# Patient Record
Sex: FEMALE | Race: WHITE | Employment: FULL TIME | URBAN - METROPOLITAN AREA
[De-identification: names, ages, dates, MRNs, and addresses within clinical notes are randomized per-mention and may not be internally consistent; named-entity substitution may affect disease eponyms.]

---

## 2017-03-30 ENCOUNTER — TELEPHONE (OUTPATIENT)
Dept: INTERNAL MEDICINE CLINIC | Age: 32
End: 2017-03-30

## 2017-03-30 NOTE — TELEPHONE ENCOUNTER
Pt states that she has such a lower back issue and threw her back out while exercising last night. She was advised that she would need to be evaluated to receive proper treatment.  Pt has an appointment scheduled with DSE,NP for tomorrow at 1140am.

## 2017-03-30 NOTE — TELEPHONE ENCOUNTER
Patient states she threw her back out again, and she would like to know if Dr. Janis Eng would prescribe something for her.

## 2017-03-31 ENCOUNTER — OFFICE VISIT (OUTPATIENT)
Dept: INTERNAL MEDICINE CLINIC | Age: 32
End: 2017-03-31

## 2017-03-31 VITALS
OXYGEN SATURATION: 96 % | SYSTOLIC BLOOD PRESSURE: 120 MMHG | BODY MASS INDEX: 32.59 KG/M2 | RESPIRATION RATE: 18 BRPM | WEIGHT: 166 LBS | HEART RATE: 78 BPM | HEIGHT: 60 IN | DIASTOLIC BLOOD PRESSURE: 80 MMHG | TEMPERATURE: 98 F

## 2017-03-31 VITALS
RESPIRATION RATE: 18 BRPM | BODY MASS INDEX: 32.59 KG/M2 | HEIGHT: 60 IN | OXYGEN SATURATION: 98 % | WEIGHT: 166 LBS | TEMPERATURE: 98 F | SYSTOLIC BLOOD PRESSURE: 120 MMHG | DIASTOLIC BLOOD PRESSURE: 80 MMHG | HEART RATE: 78 BPM

## 2017-03-31 DIAGNOSIS — S39.012A LUMBAR STRAIN, INITIAL ENCOUNTER: Primary | ICD-10-CM

## 2017-03-31 DIAGNOSIS — M54.5 ACUTE RIGHT-SIDED LOW BACK PAIN, WITH SCIATICA PRESENCE UNSPECIFIED: Primary | ICD-10-CM

## 2017-03-31 RX ORDER — CYCLOBENZAPRINE HCL 10 MG
10 TABLET ORAL
Qty: 30 TAB | Refills: 0 | Status: SHIPPED | OUTPATIENT
Start: 2017-03-31 | End: 2018-01-10

## 2017-03-31 NOTE — MR AVS SNAPSHOT
Visit Information Date & Time Provider Department Dept. Phone Encounter #  
 3/31/2017  2:00 PM MD [facility] [phone] Your Appointments 3/31/2017  2:00 PM  
ACUTE CARE with MD [facility] ([address]) Appt Note: back pain  
 [address], [address] [place] [address]  
[address], [place] [address] [id number] Upcoming Health Maintenance Date Due Pneumococcal 19-64 Medium Risk (1 of 1 - PPSV23) 10/29/2006 PAP AKA CERVICAL CYTOLOGY 4/11/2019 DTaP/Tdap/Td series (2 - Td) 5/2/2026 Allergies as of 3/31/2017  Review Complete On: 3/31/2017 By: Pily Thomson LPN Severity Noted Reaction Type Reactions Sulfa (Sulfonamide Antibiotics) Medium 12/17/2014   Side Effect Rash Minocycline  10/31/2013    Other (comments) Brain swelling Current Immunizations  Reviewed on 5/2/2016 Name Date Tdap 5/2/2016 Not reviewed this visit You Were Diagnosed With   
  
 Codes Comments Lumbar strain, initial encounter    -  Primary ICD-10-CM: M39.913B ICD-9-CM: 918. 2 Vitals BP Pulse Temp Resp Height(growth percentile) Weight(growth percentile) 120/80 (BP 1 Location: Left arm, BP Patient Position: Sitting) 78 98 °F (36.7 °C) 18 5' (1.524 m) 166 lb (75.3 kg) LMP SpO2 BMI OB Status Smoking Status 03/08/2017 96% 32.42 kg/m2 Having regular periods Never Smoker BMI and BSA Data Body Mass Index Body Surface Area  
 32.42 kg/m 2 1.79 m 2 Preferred Pharmacy Pharmacy Name Phone [id number], [address] Eliud Snow [phone] Your Updated Medication List  
  
   
This list is accurate as of: 3/31/17 12:58 PM.  Always use your most recent med list.  
  
  
  
  
 albuterol 90 mcg/actuation inhaler Commonly known as:  PROVENTIL HFA, VENTOLIN HFA, PROAIR HFA  
 Take 2 Puffs by inhalation every four (4) hours as needed for Wheezing. cyclobenzaprine 10 mg tablet Commonly known as:  FLEXERIL Take 1 Tab by mouth three (3) times daily (with meals). DULERA 100-5 mcg/actuation HFA inhaler Generic drug:  mometasone-formoterol Take 2 Puffs by inhalation two (2) times a day. levothyroxine 25 mcg tablet Commonly known as:  SYNTHROID Take 1 Tab by mouth Daily (before breakfast). montelukast 10 mg tablet Commonly known as:  SINGULAIR Take 1 Tab by mouth daily. NASACORT AQ 55 mcg nasal inhaler Generic drug:  triamcinolone 2 Sprays daily. ZyrTEC 10 mg Cap Generic drug:  Cetirizine Take  by mouth. Prescriptions Sent to Pharmacy Refills  
 cyclobenzaprine (FLEXERIL) 10 mg tablet 0 Sig: Take 1 Tab by mouth three (3) times daily (with meals). Class: Normal  
 Pharmacy: [facility]/pharmacy [address] [place] [address] #: [phone] Route: Oral  
  
Introducing [facility]! Dariel Liu introduces "[employer] patient portal. Now you can access parts of your medical record, email your doctor's office, and request medication refills online. 1. In your internet browser, go to [email or web address] 2. Click on the First Time User? Click Here link in the Sign In box. You will see the New Member Sign Up page. 3. Enter your "[employer] Access Code exactly as it appears below. You will not need to use this code after youve completed the sign-up process. If you do not sign up before the expiration date, you must request a new code. · "[employer] Access Code: [email or web address] Expires: 6/29/2017 12:54 PM 
 
4. Enter the last four digits of your Social Security Number (xxxx) and Date of Birth (mm/dd/yyyy) as indicated and click Submit. You will be taken to the next sign-up page. 5. Create a "[employer] ID.  This will be your "[employer] login ID and cannot be changed, so think of one that is secure and easy to remember. 6. Create a [employer] password. You can change your password at any time. 7. Enter your Password Reset Question and Answer. This can be used at a later time if you forget your password. 8. Enter your e-mail address. You will receive e-mail notification when new information is available in [address] 9. Click Sign Up. You can now view and download portions of your medical record. 10. Click the Download Summary menu link to download a portable copy of your medical information. If you have questions, please visit the Frequently Asked Questions section of the [employer] website. Remember, [employer] is NOT to be used for urgent needs. For medical emergencies, dial 911. Now available from your iPhone and Android! Please provide this summary of care documentation to your next provider. Your primary care clinician is listed as Bibi Kaur. If you have any questions after today's visit, please call [phone].

## 2017-03-31 NOTE — PROGRESS NOTES
Chief Complaint   Patient presents with    Back Pain     started tuesday, threw back out doing exercises    Numbness     right leg     Neck Pain     this morning

## 2017-03-31 NOTE — PROGRESS NOTES
Subjective:      Trish Guerrero is a 36 y.o. female who presents today with low back pain. Hx of degenerative disc in lumbar region. She denies any trauma. Started about 3 days ago . Painful to stand for any length of time. Pain radiates down right leg    Patient Active Problem List   Diagnosis Code    Nephrolithiasis N20.0    Allergic rhinitis J30.9    Pap smear for cervical cancer screening Z12.4    PCOS (polycystic ovarian syndrome) E28.2    Allergic asthma without complication B71.659    Acquired hypothyroidism E03.9     Current Outpatient Prescriptions   Medication Sig Dispense Refill    cyclobenzaprine (FLEXERIL) 10 mg tablet Take 1 Tab by mouth three (3) times daily (with meals). 30 Tab 0    levothyroxine (SYNTHROID) 25 mcg tablet Take 1 Tab by mouth Daily (before breakfast). 30 Tab 5    montelukast (SINGULAIR) 10 mg tablet Take 1 Tab by mouth daily. 30 Tab 5    Cetirizine (ZYRTEC) 10 mg cap Take  by mouth.  triamcinolone (NASACORT AQ) 55 mcg nasal inhaler 2 Sprays daily.  mometasone-formoterol (DULERA) 100-5 mcg/actuation HFA inhaler Take 2 Puffs by inhalation two (2) times a day.  albuterol (PROVENTIL HFA, VENTOLIN HFA, PROAIR HFA) 90 mcg/actuation inhaler Take 2 Puffs by inhalation every four (4) hours as needed for Wheezing. 1 Inhaler 5        Review of Systems    Pertinent items are noted in HPI. Objective:     Visit Vitals    /80 (BP 1 Location: Left arm, BP Patient Position: Sitting)    Pulse 78    Temp 98 °F (36.7 °C)    Resp 18    Ht 5' (1.524 m)    Wt 166 lb (75.3 kg)    LMP 03/08/2017    SpO2 96%    BMI 32.42 kg/m2     General appearance: alert, cooperative, no distress, appears stated age  Head: Normocephalic, without obvious abnormality, atraumatic  Back: no tenderness along lumbar spine. Significant muscular tension in bilateral lower back. Assessment/Plan:     1.  Lumbar strain, initial encounter  -flexeril 10mg tid for first 2 days, then decrease use to bedtime as needed  -aleve 2 tab bid with food for next 3-5 days.  -ice 20 minutes followed by heat for 20 minutes daily  -stretches    Orders Placed This Encounter    cyclobenzaprine (FLEXERIL) 10 mg tablet     Sig: Take 1 Tab by mouth three (3) times daily (with meals). Dispense:  30 Tab     Refill:  0         Follow-up Disposition:     Return if symptoms worsen or fail to improve. Advised patient to call back or return to office if symptoms worsen/change/persist.     Discussed expected course/resolution/complications of diagnosis in detail with patient. Medication risks/benefits/costs/interactions/alternatives discussed with patient. Patient was given an after visit summary which includes diagnoses, current medications, & vitals. Patient expressed understanding with the diagnosis and plan.

## 2017-05-15 RX ORDER — MONTELUKAST SODIUM 10 MG/1
TABLET ORAL
Qty: 30 TAB | Refills: 5 | Status: SHIPPED | OUTPATIENT
Start: 2017-05-15 | End: 2017-10-13 | Stop reason: SDUPTHER

## 2017-10-13 RX ORDER — MONTELUKAST SODIUM 10 MG/1
TABLET ORAL
Qty: 30 TAB | Refills: 3 | Status: SHIPPED | OUTPATIENT
Start: 2017-10-13 | End: 2019-04-19 | Stop reason: SDUPTHER

## 2018-01-09 DIAGNOSIS — J45.901 REACTIVE AIRWAY DISEASE, UNSPECIFIED ASTHMA SEVERITY, WITH ACUTE EXACERBATION: ICD-10-CM

## 2018-01-09 RX ORDER — ALBUTEROL SULFATE 90 UG/1
2 AEROSOL, METERED RESPIRATORY (INHALATION)
Qty: 1 INHALER | Refills: 5 | Status: CANCELLED | OUTPATIENT
Start: 2018-01-09

## 2018-01-09 NOTE — TELEPHONE ENCOUNTER
Pt called stating that she is afraid that she is going to have an asthma attack and wants to know if script will be refilled or does she need to come in for an appointment before inhalers will be refilled.  Pt would like to know as soon as possible so that she can schedule appointment if necessary

## 2018-01-09 NOTE — TELEPHONE ENCOUNTER
Requested Prescriptions     Pending Prescriptions Disp Refills    albuterol (PROVENTIL HFA, VENTOLIN HFA, PROAIR HFA) 90 mcg/actuation inhaler 1 Inhaler 5     Sig: Take 2 Puffs by inhalation every four (4) hours as needed for Wheezing.  mometasone-formoterol (DULERA) 100-5 mcg/actuation HFA inhaler       Sig: Take 2 Puffs by inhalation two (2) times a day. Pt would like a refill on her inhalers. Pt states that she has a cold and it is causing her asthma to flare up.      Last OV: 3/31/17  Next OV:NONE

## 2018-01-09 NOTE — TELEPHONE ENCOUNTER
Last routine/annual office visit - 05/02/16 (1 yr f/u requested)  Next office visit - n/a  Last refill - albuterol 11. 2.15; dulera - never prescribed by our office. Pt is scheduled for CPE tomorrow; Per Dr. De Leon November the request for medication will be completed during visit. Requested Prescriptions     Pending Prescriptions Disp Refills    albuterol (PROVENTIL HFA, VENTOLIN HFA, PROAIR HFA) 90 mcg/actuation inhaler 1 Inhaler 5     Sig: Take 2 Puffs by inhalation every four (4) hours as needed for Wheezing.  mometasone-formoterol (DULERA) 100-5 mcg/actuation HFA inhaler       Sig: Take 2 Puffs by inhalation two (2) times a day.

## 2018-01-10 ENCOUNTER — OFFICE VISIT (OUTPATIENT)
Dept: INTERNAL MEDICINE CLINIC | Age: 33
End: 2018-01-10

## 2018-01-10 VITALS
BODY MASS INDEX: 33.18 KG/M2 | WEIGHT: 169 LBS | TEMPERATURE: 99.7 F | HEART RATE: 87 BPM | OXYGEN SATURATION: 97 % | RESPIRATION RATE: 15 BRPM | DIASTOLIC BLOOD PRESSURE: 72 MMHG | SYSTOLIC BLOOD PRESSURE: 106 MMHG | HEIGHT: 60 IN

## 2018-01-10 DIAGNOSIS — Z12.4 PAP SMEAR FOR CERVICAL CANCER SCREENING: Chronic | ICD-10-CM

## 2018-01-10 DIAGNOSIS — Z00.00 ANNUAL PHYSICAL EXAM: Primary | ICD-10-CM

## 2018-01-10 DIAGNOSIS — J45.20 MILD INTERMITTENT ASTHMA WITHOUT COMPLICATION: ICD-10-CM

## 2018-01-10 DIAGNOSIS — Z79.899 ENCOUNTER FOR LONG-TERM (CURRENT) USE OF MEDICATIONS: ICD-10-CM

## 2018-01-10 DIAGNOSIS — E03.9 HYPOTHYROIDISM, ADULT: ICD-10-CM

## 2018-01-10 RX ORDER — ALBUTEROL SULFATE 90 UG/1
1 AEROSOL, METERED RESPIRATORY (INHALATION)
Qty: 1 INHALER | Refills: 1 | Status: SHIPPED | OUTPATIENT
Start: 2018-01-10 | End: 2021-06-03 | Stop reason: ALTCHOICE

## 2018-01-10 NOTE — MR AVS SNAPSHOT
Visit Information Date & Time Provider Department Dept. Phone Encounter #  
 1/10/2018 10:00 AM Mamta Haider MD [place] [address] [facility] [phone] [id number] Follow-up Instructions Return in about 6 months (around 7/10/2018) for hypothyroid, asthma. Upcoming Health Maintenance Date Due  
 PAP AKA CERVICAL CYTOLOGY 4/11/2019 DTaP/Tdap/Td series (2 - Td) 5/2/2026 Allergies as of 1/10/2018  Review Complete On: 1/10/2018 By: Mai Alvarez Severity Noted Reaction Type Reactions Sulfa (Sulfonamide Antibiotics) Medium 12/17/2014   Side Effect Rash Minocycline  10/31/2013    Other (comments) Brain swelling Current Immunizations  Reviewed on 5/2/2016 Name Date Tdap 5/2/2016 Not reviewed this visit You Were Diagnosed With   
  
 Codes Comments Annual physical exam    -  Primary ICD-10-CM: Z00.00 ICD-9-CM: V70.0 Hypothyroidism, adult     ICD-10-CM: E03.9 ICD-9-CM: 244.9 Encounter for long-term (current) use of medications     ICD-10-CM: Z79.899 ICD-9-CM: V58.69 Pap smear for cervical cancer screening     ICD-10-CM: Z12.4 ICD-9-CM: V76.2 Vitals BP Pulse Temp Resp Height(growth percentile) Weight(growth percentile) 106/72 (BP 1 Location: Left arm, BP Patient Position: Sitting) 87 99.7 °F (37.6 °C) (Oral) 15 5' (1.524 m) 169 lb (76.7 kg) LMP SpO2 BMI OB Status Smoking Status 01/05/2018 (Exact Date) 97% 33.01 kg/m2 Having regular periods Never Smoker Vitals History BMI and BSA Data Body Mass Index Body Surface Area 33.01 kg/m 2 1.8 m 2 Preferred Pharmacy Pharmacy Name Phone [id number], [address] Ry Burden [phone] Your Updated Medication List  
  
   
This list is accurate as of: 1/10/18 10:54 AM.  Always use your most recent med list.  
  
  
  
  
 albuterol 90 mcg/actuation inhaler Commonly known as:  PROVENTIL HFA, VENTOLIN HFA, PROAIR HFA Take 1 Puff by inhalation every four (4) hours as needed for Wheezing. levothyroxine 25 mcg tablet Commonly known as:  SYNTHROID Take 1 Tab by mouth Daily (before breakfast). montelukast 10 mg tablet Commonly known as:  SINGULAIR  
TAKE 1 TAB BY MOUTH DAILY. ZyrTEC 10 mg Cap Generic drug:  Cetirizine Take  by mouth. Prescriptions Sent to Pharmacy Refills  
 albuterol (PROVENTIL HFA, VENTOLIN HFA, PROAIR HFA) 90 mcg/actuation inhaler 1 Sig: Take 1 Puff by inhalation every four (4) hours as needed for Wheezing. Class: Normal  
 Pharm[facility]  #: [phone] Route: Inhalation We Performed the Following CBC WITH AUTOMATED DIFF [45567 CPT(R)] LIPID PANEL [49972 CPT(R)] METABOLIC PANEL, COMPREHENSIVE [83736 CPT(R)] REFERRAL TO OBSTETRICS AND GYNECOLOGY [REF51 Custom] T4, FREE [id number] CPT(R)] TSH 3RD GENERATION [63650 CPT(R)] URINALYSIS W/ RFLX MICROSCOPIC [61890 CPT(R)] Follow-up Instructions Return in about 6 months (around 7/10/2018) for hypothyroid, asthma. Referral Information Referral ID Referred By Referred To  
  
 [id number] [address] [facility], [address] Phone: [phone] Fax: [phone] Visits Status Start Date End Date 1 New Request 1/10/18 1/10/19 If your referral has a status of pending review or denied, additional information will be sent to support the outcome of this decision. Introducing [facility]! Michelle Townsend introduces [employer] patient portal. Now you can access parts of your medical record, email your doctor's office, and request medication refills online. 1. In your internet browser, go to [email or web address] 2. Click on the First Time User? Click Here link in the Sign In box. You will see the New Member Sign Up page. 3. Enter your [employer] Access Code exactly as it appears below. You will not need to use this code after youve completed the sign-up process. If you do not sign up before the expiration date, you must request a new code. · [employer] Access Code: [email or web address] Expires: 4/10/2018 10:45 AM 
 
4. Enter the last four digits of your Social Security Number (xxxx) and Date of Birth (mm/dd/yyyy) as indicated and click Submit. You will be taken to the next sign-up page. 5. Create a [employer] ID. This will be your [employer] login ID and cannot be changed, so think of one that is secure and easy to remember. 6. Create a [employer] password. You can change your password at any time. 7. Enter your Password Reset Question and Answer. This can be used at a later time if you forget your password. 8. Enter your e-mail address. You will receive e-mail notification when new information is available in [address] 9. Click Sign Up. You can now view and download portions of your medical record. 10. Click the Download Summary menu link to download a portable copy of your medical information. If you have questions, please visit the Frequently Asked Questions section of the [employer] website. Remember, [employer] is NOT to be used for urgent needs. For medical emergencies, dial 911. Now available from your iPhone and Android! Please provide this summary of care documentation to your next provider. Your primary care clinician is listed as Bibi Kaur. If you have any questions after today's visit, please call [phone].

## 2018-01-10 NOTE — PROGRESS NOTES
Subjective:      Jennifer Black is a 29 y.o. female who presents today for her annual exam.     She saw an allergist, Dr. Romeo Camacho in 2015/2016 - at that time she was having an asthma flare and was put on dulera, singulair, and zyrtec at that time. She has not had any trouble since that time. She got a cold last week and started to have coughing, but no shortness of breath. She has history of hypothyroid. Stopped taking medication after about 1 month of medications. Patient Active Problem List   Diagnosis Code    Nephrolithiasis N20.0    Allergic rhinitis J30.9    Pap smear for cervical cancer screening Z12.4    PCOS (polycystic ovarian syndrome) E28.2    Allergic asthma without complication Z71.161    Acquired hypothyroidism E03.9     Current Outpatient Prescriptions   Medication Sig Dispense Refill    montelukast (SINGULAIR) 10 mg tablet TAKE 1 TAB BY MOUTH DAILY. 30 Tab 3    Cetirizine (ZYRTEC) 10 mg cap Take  by mouth.  mometasone-formoterol (DULERA) 100-5 mcg/actuation HFA inhaler Take 2 Puffs by inhalation two (2) times a day.  albuterol (PROVENTIL HFA, VENTOLIN HFA, PROAIR HFA) 90 mcg/actuation inhaler Take 2 Puffs by inhalation every four (4) hours as needed for Wheezing. 1 Inhaler 5    levothyroxine (SYNTHROID) 25 mcg tablet Take 1 Tab by mouth Daily (before breakfast). 30 Tab 5        Review of Systems    A comprehensive review of systems was negative except for that written in the HPI. Objective:     Visit Vitals    /72 (BP 1 Location: Left arm, BP Patient Position: Sitting)    Pulse 87    Temp 99.7 °F (37.6 °C) (Oral)    Resp 15    Ht 5' (1.524 m)    Wt 169 lb (76.7 kg)    LMP 01/05/2018 (Exact Date)    SpO2 97%    BMI 33.01 kg/m2     General:  Alert, cooperative, no distress, appears stated age. Head:  Normocephalic, without obvious abnormality, atraumatic. Eyes:  Conjunctivae/corneas clear. PERRL, EOMs intact. Fundi benign.    Ears:  Normal TMs and external ear canals both ears. Nose: Nares normal. Septum midline. Mucosa normal. No drainage or sinus tenderness. Throat: Lips, mucosa, and tongue normal. Teeth and gums normal.   Neck: Supple, symmetrical, trachea midline, no adenopathy, thyroid: no enlargement/tenderness/nodules, no carotid bruit and no JVD. Back:   Symmetric, no curvature. ROM normal. No CVA tenderness. Lungs:   Clear to auscultation bilaterally. Chest wall:  No tenderness or deformity. Heart:  Regular rate and rhythm, S1, S2 normal, no murmur, click, rub or gallop. Breast Exam:  No tenderness, masses, or nipple abnormality. Abdomen:   Soft, non-tender. Bowel sounds normal. No masses,  No organomegaly. Extremities: Extremities normal, atraumatic, no cyanosis or edema. Pulses: 2+ and symmetric all extremities. Skin: Skin color, texture, turgor normal. No rashes or lesions. Lymph nodes: Cervical, supraclavicular, and axillary nodes normal.           Assessment/Plan:     1. Annual physical exam  - last gyn with Dr. Osbaldo Garcia was April, 2017. She would like to switch to a provider that has midwives. -referred patient to Dr. Mike Thomas. Sudheer Fuller with [facility].    - CBC WITH AUTOMATED DIFF  - METABOLIC PANEL, COMPREHENSIVE  - LIPID PANEL  - URINALYSIS W/ RFLX MICROSCOPIC    Also, she has additional complaints of the following --.     2. Hypothyroidism, adult  -stressed compliance with use of levothyroxine.  -check labs. Restart medication if needed    - TSH 3RD GENERATION  - T4, FREE    3. Encounter for long-term (current) use of medications      4. Pap smear for cervical cancer screening  -see above. Patient referred to Dr. Mike Thomas. Aurther Crews.    - REFERRAL TO OBSTETRICS AND GYNECOLOGY     5. Asthma  -mild intermittent  -refilled her albuterol today.  -will get PFT done with Dr. Meagan Nascimento. Follow-up Disposition:     Follow up in 6 months     Return if symptoms worsen or fail to improve.    Advised patient to call back or return to office if symptoms worsen/change/persist.     Discussed expected course/resolution/complications of diagnosis in detail with patient. Medication risks/benefits/costs/interactions/alternatives discussed with patient. Patient was given an after visit summary which includes diagnoses, current medications, & vitals. Patient expressed understanding with the diagnosis and plan.

## 2018-01-10 NOTE — PROGRESS NOTES
Patient's identity verified with two patient identifiers (name and date of birth). 1. Have you been to the ER, urgent care clinic since your last visit? Hospitalized since your last visit? No  2. Have you seen or consulted any other health care providers outside of the [address] [place] [address] since your last visit? Include any pap smears or colon screening. No    Chief Complaint   Patient presents with    Complete Physical     Fasting.  Medication Evaluation     Requesting Albuterol and Dulera (never Rx by us) refill.  Asthma     Would like to discuss. \"I don't know\", unsure if environmental or anything else. Fasting. Health Maintenance Due   Topic Date Due    Pneumococcal 19-64 Medium Risk (1 of 1 - PPSV23)  Has not had. 10/29/2006    Influenza Age 9 to Adult   Has not had, declines. 08/01/2017     Reviewed record in preparation for visit and have obtained necessary documentation.     Wt Readings from Last 3 Encounters:   01/10/18 169 lb (76.7 kg)   03/31/17 166 lb (75.3 kg)   03/31/17 166 lb (75.3 kg)     Temp Readings from Last 3 Encounters:   01/10/18 99.7 °F (37.6 °C) (Oral)   03/31/17 98 °F (36.7 °C)   03/31/17 98 °F (36.7 °C) (Oral)     BP Readings from Last 3 Encounters:   01/10/18 106/72   03/31/17 120/80   03/31/17 120/80     Pulse Readings from Last 3 Encounters:   01/10/18 87   03/31/17 78   03/31/17 78     Learning Assessment:  :     Learning Assessment 1/10/2018 3/23/2015 10/31/2013   PRIMARY LEARNER Patient Patient Patient   HIGHEST LEVEL OF EDUCATION - PRIMARY LEARNER  SOME COLLEGE 4 YEARS OF COLLEGE -   BARRIERS PRIMARY LEARNER NONE NONE -   CO-LEARNER CAREGIVER No No -   PRIMARY LANGUAGE ENGLISH ENGLISH ENGLISH    NEED - No -   LEARNER PREFERENCE PRIMARY PICTURES DEMONSTRATION DEMONSTRATION     VIDEOS - -   LEARNING SPECIAL TOPICS - no -   ANSWERED BY patient patient patient   RELATIONSHIP SELF SELF SELF       Depression Screening:  :     PHQ over the last two weeks 1/10/2018   Little interest or pleasure in doing things Not at all   Feeling down, depressed or hopeless Not at all   Total Score PHQ 2 0       Fall Risk Assessment:  :     No flowsheet data found. Abuse Screening:  :     Abuse Screening Questionnaire 1/10/2018 11/2/2015 12/17/2014   Do you ever feel afraid of your partner? N N N   Are you in a relationship with someone who physically or mentally threatens you? N N N   Is it safe for you to go home?  Mike Mesa

## 2018-01-11 ENCOUNTER — TELEPHONE (OUTPATIENT)
Dept: INTERNAL MEDICINE CLINIC | Age: 33
End: 2018-01-11

## 2018-01-11 LAB
ALBUMIN SERPL-MCNC: 4.5 G/DL (ref 3.5–5.5)
ALBUMIN/GLOB SERPL: 1.8 {RATIO} (ref 1.2–2.2)
ALP SERPL-CCNC: 55 IU/L (ref 39–117)
ALT SERPL-CCNC: 18 IU/L (ref 0–32)
APPEARANCE UR: CLEAR
AST SERPL-CCNC: 19 IU/L (ref 0–40)
BASOPHILS # BLD AUTO: 0 X10E3/UL (ref 0–0.2)
BASOPHILS NFR BLD AUTO: 1 %
BILIRUB SERPL-MCNC: <0.2 MG/DL (ref 0–1.2)
BILIRUB UR QL STRIP: NEGATIVE
BUN SERPL-MCNC: 7 MG/DL (ref 6–20)
BUN/CREAT SERPL: 12 (ref 9–23)
CALCIUM SERPL-MCNC: 9.5 MG/DL (ref 8.7–10.2)
CHLORIDE SERPL-SCNC: 101 MMOL/L (ref 96–106)
CHOLEST SERPL-MCNC: 183 MG/DL (ref 100–199)
CO2 SERPL-SCNC: 26 MMOL/L (ref 18–29)
COLOR UR: YELLOW
CREAT SERPL-MCNC: 0.58 MG/DL (ref 0.57–1)
EOSINOPHIL # BLD AUTO: 0.3 X10E3/UL (ref 0–0.4)
EOSINOPHIL NFR BLD AUTO: 7 %
ERYTHROCYTE [DISTWIDTH] IN BLOOD BY AUTOMATED COUNT: 13.8 % (ref 12.3–15.4)
GLOBULIN SER CALC-MCNC: 2.5 G/DL (ref 1.5–4.5)
GLUCOSE SERPL-MCNC: 88 MG/DL (ref 65–99)
GLUCOSE UR QL: NEGATIVE
HCT VFR BLD AUTO: 41 % (ref 34–46.6)
HDLC SERPL-MCNC: 57 MG/DL
HGB BLD-MCNC: 13.1 G/DL (ref 11.1–15.9)
HGB UR QL STRIP: NEGATIVE
IMM GRANULOCYTES # BLD: 0 X10E3/UL (ref 0–0.1)
IMM GRANULOCYTES NFR BLD: 0 %
INTERPRETATION, 910389: NORMAL
KETONES UR QL STRIP: NEGATIVE
LDLC SERPL CALC-MCNC: 110 MG/DL (ref 0–99)
LEUKOCYTE ESTERASE UR QL STRIP: NEGATIVE
LYMPHOCYTES # BLD AUTO: 1.3 X10E3/UL (ref 0.7–3.1)
LYMPHOCYTES NFR BLD AUTO: 31 %
MCH RBC QN AUTO: 31 PG (ref 26.6–33)
MCHC RBC AUTO-ENTMCNC: 32 G/DL (ref 31.5–35.7)
MCV RBC AUTO: 97 FL (ref 79–97)
MICRO URNS: NORMAL
MONOCYTES # BLD AUTO: 0.6 X10E3/UL (ref 0.1–0.9)
MONOCYTES NFR BLD AUTO: 14 %
NEUTROPHILS # BLD AUTO: 2 X10E3/UL (ref 1.4–7)
NEUTROPHILS NFR BLD AUTO: 47 %
NITRITE UR QL STRIP: NEGATIVE
PH UR STRIP: 7.5 [PH] (ref 5–7.5)
PLATELET # BLD AUTO: 315 X10E3/UL (ref 150–379)
POTASSIUM SERPL-SCNC: 4.3 MMOL/L (ref 3.5–5.2)
PROT SERPL-MCNC: 7 G/DL (ref 6–8.5)
PROT UR QL STRIP: NEGATIVE
RBC # BLD AUTO: 4.22 X10E6/UL (ref 3.77–5.28)
SODIUM SERPL-SCNC: 141 MMOL/L (ref 134–144)
SP GR UR: 1.02 (ref 1–1.03)
T4 FREE SERPL-MCNC: 1.1 NG/DL (ref 0.82–1.77)
TRIGL SERPL-MCNC: 80 MG/DL (ref 0–149)
TSH SERPL DL<=0.005 MIU/L-ACNC: 3.47 UIU/ML (ref 0.45–4.5)
UROBILINOGEN UR STRIP-MCNC: 0.2 MG/DL (ref 0.2–1)
VLDLC SERPL CALC-MCNC: 16 MG/DL (ref 5–40)
WBC # BLD AUTO: 4.1 X10E3/UL (ref 3.4–10.8)

## 2018-01-11 NOTE — TELEPHONE ENCOUNTER
----- Message from Cassidy Steele MD sent at 1/10/2018  4:31 PM EST -----  Please call Dr. Dax Suarez, allergist, for her pulmonary function tests and his notes.   Done 2015/2016    Thanks    Jasper Del Real

## 2018-01-11 NOTE — TELEPHONE ENCOUNTER
Call to Dr. Elinor Suh office. Spoke with Jesse Veliz (front office staff) & requested pt's records. Jesse Veliz requested that an fax be sent to their office requesting the records. Fax number provided: [phone]    Request for records faxed to Dr. Radha Menjivar per Loletta Eisenmenger, MD's request. Specifically requested patient's pulmonary function test done around 2015/2016 and office notes.  Fax confirmation received 1/11/2018 4:27 PM

## 2018-01-19 ENCOUNTER — TELEPHONE (OUTPATIENT)
Dept: INTERNAL MEDICINE CLINIC | Age: 33
End: 2018-01-19

## 2018-01-19 NOTE — TELEPHONE ENCOUNTER
----- Message from Viola Gutta sent at 1/19/2018  1:45 PM EST -----  Regarding: Dr. Grier Severance  Pt is calling to f/u on blood work. The best contact is [phone].

## 2018-01-19 NOTE — TELEPHONE ENCOUNTER
Spoke to pt - pt verified self and  for privacy precautions. Mounika Pride was advised of the recommendations/results provided by Krystal Gordon MD in her letter. Pt was informed a letter was generated on 01.15.17 and she should receive it within the next 10-14 working business days, if not she can contact the office and we will provide another copy.  questioned if she should be taking Rx Synthroid previously prescribed last year. Per Dr. Kermit Gilford, she was informed that she did not need to take this medication as her thyroid levels are WNL and she has not taken the medication since prescribed. Pt acknowledged understanding and all questions were answered to patients satisfaction. No further questions or concerns at this time.

## 2018-02-22 ENCOUNTER — OFFICE VISIT (OUTPATIENT)
Dept: INTERNAL MEDICINE CLINIC | Age: 33
End: 2018-02-22

## 2018-02-22 VITALS
HEIGHT: 60 IN | WEIGHT: 164 LBS | RESPIRATION RATE: 15 BRPM | HEART RATE: 76 BPM | TEMPERATURE: 98.9 F | SYSTOLIC BLOOD PRESSURE: 108 MMHG | BODY MASS INDEX: 32.2 KG/M2 | DIASTOLIC BLOOD PRESSURE: 74 MMHG | OXYGEN SATURATION: 98 %

## 2018-02-22 DIAGNOSIS — S16.1XXA STRAIN OF NECK MUSCLE, INITIAL ENCOUNTER: ICD-10-CM

## 2018-02-22 DIAGNOSIS — F41.9 ANXIETY: ICD-10-CM
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
## 2018-02-22 RX ORDER — CYCLOBENZAPRINE HCL 10 MG
10 TABLET ORAL AS NEEDED
COMMUNITY
End: 2018-10-11 | Stop reason: ALTCHOICE

## 2018-02-22 RX ORDER — IBUPROFEN 200 MG
400 TABLET ORAL
COMMUNITY
End: 2021-06-03 | Stop reason: ALTCHOICE
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
## 2018-03-01 ENCOUNTER — TELEPHONE (OUTPATIENT)
Dept: INTERNAL MEDICINE CLINIC | Age: 33
End: 2018-03-01

## 2018-03-01 NOTE — TELEPHONE ENCOUNTER
Patient states that she faxed a list of providers last Friday. I did not receive it. She prefers to see someone in the [employer] system.     Plan: referred her to Mandeep Keating NP for treatment of her anxiety

## 2018-03-01 NOTE — TELEPHONE ENCOUNTER
----- Message from [facility] sent at 3/1/2018  7:58 AM EST -----  Regarding: Dr. Radhika Henriquez [phone] needs a recommendation for a behavioral health therapist.

## 2018-03-01 NOTE — TELEPHONE ENCOUNTER
Patient returned your call, she states she did fax the doctor's numbers to you on Friday, and she would like the name of someone in [facility].

## 2018-03-06 ENCOUNTER — HOSPITAL ENCOUNTER (OUTPATIENT)
Dept: PHYSICAL THERAPY | Age: 33
Discharge: HOME | End: 2018-03-06
Payer: COMMERCIAL

## 2018-03-06 PROCEDURE — 97161 PT EVAL LOW COMPLEX 20 MIN: CPT | Performed by: PHYSICAL THERAPIST

## 2018-03-06 PROCEDURE — 97110 THERAPEUTIC EXERCISES: CPT | Performed by: PHYSICAL THERAPIST
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
## 2018-03-07 ENCOUNTER — HOSPITAL ENCOUNTER (OUTPATIENT)
Dept: PHYSICAL THERAPY | Age: 33
End: 2018-03-07

## 2018-03-14 ENCOUNTER — HOSPITAL ENCOUNTER (OUTPATIENT)
Dept: PHYSICAL THERAPY | Age: 33
Discharge: HOME | End: 2018-03-14
Payer: COMMERCIAL

## 2018-03-14 PROCEDURE — 97110 THERAPEUTIC EXERCISES: CPT | Performed by: PHYSICAL THERAPIST

## 2018-03-14 PROCEDURE — 97140 MANUAL THERAPY 1/> REGIONS: CPT | Performed by: PHYSICAL THERAPIST

## 2018-03-14 NOTE — PROGRESS NOTES
PT DAILY TREATMENT NOTE 2-15    Patient Name: Darren Paredes  Date:3/14/2018  : 1985  [x]  Patient  Verified  Payor: Simin Madsen / Plan: Rich See PPO / Product Type: PPO /    In time: 1:30 PM  Out time: 2:20 PM  Total Treatment Time (min): 50   Visit #: 2     Treatment Area: Neck pain [M54.2]    SUBJECTIVE  Pain Level (0-10 scale): 1/10  Any medication changes, allergies to medications, adverse drug reactions, diagnosis change, or new procedure performed?: [x] No    [] Yes (see summary sheet for update)  Subjective functional status/changes:   [] No changes reported  \"those neck stretches really helped! \" states she is feeling better; having less headaches    OBJECTIVE    Modality rationale: decrease inflammation and decrease pain to improve the patients ability to perform daily activities; decrease headaches   Min Type Additional Details    [] Estim: []Att   []Unatt        []TENS instruct                  []IFC  []Premod   []NMES                     []Other:  []w/US   []w/ice   []w/heat  Position:  Location:    []  Traction: [] Cervical       []Lumbar                       [] Prone          []Supine                       []Intermittent   []Continuous Lbs:  [] before manual  [] after manual  []w/heat    []  Ultrasound: []Continuous   [] Pulsed at:                            []1MHz   []3MHz Location:  W/cm2:    []  Paraffin         Location:  []w/heat   declined [x]  Ice     []  Heat  []  Ice massage Position:  Location:    []  Laser  []  Other: Position:  Location:    []  Vasopneumatic Device Pressure:       [] lo [] med [] hi   Temperature:    [x] Skin assessment post-treatment:  [x]intact []redness- no adverse reaction    []redness  adverse reaction:     40 min Therapeutic Exercise:  [x] See flow sheet :   Rationale: increase ROM and increase strength to improve the patients ability to exercise, perform household chores    10 min Manual Therapy:  STM bilat UT, LS, cervical paraspinals  Suboccipital release x2   Rationale: decrease pain, increase ROM, increase tissue extensibility and decrease trigger points  to improve the patients ability to exercise            With   [] TE   [] TA   [] neuro   [] other: Patient Education: [x] Review HEP    [] Progressed/Changed HEP based on:   [] positioning   [] body mechanics   [] transfers   [] heat/ice application    [] other:      Other Objective/Functional Measures: n/a     Pain Level (0-10 scale) post treatment: 0/10 \"amazing, so much better! \"    ASSESSMENT/Changes in Function:   Pt tolerated therapy session well-- slight symptoms L>R neck after standing shoulder horizontal adduction. Tolerated manual therapy well-- trigger points, inc'd myofascial tightness L>R UT, LS noted. Patient will continue to benefit from skilled PT services to modify and progress therapeutic interventions, address functional mobility deficits, address ROM deficits, address strength deficits, analyze and address soft tissue restrictions, analyze and cue movement patterns, analyze and modify body mechanics/ergonomics and assess and modify postural abnormalities to attain remaining goals.      []  See Plan of Care  []  See progress note/recertification  []  See Discharge Summary         Progress towards goals / Updated goals:  nt    PLAN  []  Upgrade activities as tolerated     []  Continue plan of care  []  Update interventions per flow sheet       []  Discharge due to:_  []  Other:_      Gopi Brady PT 3/14/2018  1:53 PM

## 2018-03-21 ENCOUNTER — APPOINTMENT (OUTPATIENT)
Dept: PHYSICAL THERAPY | Age: 33
End: 2018-03-21
Payer: COMMERCIAL

## 2018-03-28 ENCOUNTER — HOSPITAL ENCOUNTER (OUTPATIENT)
Dept: PHYSICAL THERAPY | Age: 33
Discharge: HOME | End: 2018-03-28
Payer: COMMERCIAL

## 2018-03-28 PROCEDURE — 97110 THERAPEUTIC EXERCISES: CPT | Performed by: PHYSICAL THERAPIST

## 2018-03-28 PROCEDURE — 97140 MANUAL THERAPY 1/> REGIONS: CPT | Performed by: PHYSICAL THERAPIST

## 2018-03-28 NOTE — PROGRESS NOTES
PT DAILY TREATMENT NOTE 2-15    Patient Name: Per Haider  Date:3/28/2018  : 1985  [x]  Patient  Verified  Payor: Corrine Bowen / Plan: Bryon Mcgovern PPO / Product Type: PPO /    In time: 10:00 AM Out time: 11:05 AM  Total Treatment Time (min): 65 (60 timed)   Visit #: 3    Treatment Area: Neck pain [M54.2]    SUBJECTIVE  Pain Level (0-10 scale): 0/10 \"tight\"  Any medication changes, allergies to medications, adverse drug reactions, diagnosis change, or new procedure performed?: [x] No    [] Yes (see summary sheet for update)  Subjective functional status/changes:   [] No changes reported  Pt states \"I feel like my neck is pinching [while pointing to L UT], maybe when I turned my head driving or something. It just feel like a pinch and tight\" She states some pain down her arm  She did not do her exercises as much since last visit due to not feeling well- \"I could tell a difference.  The exercises really help\"    OBJECTIVE    Modality rationale: decrease inflammation and decrease pain to improve the patients ability to perform daily activities; decrease headaches   Min Type Additional Details    [] Estim: []Att   []Unatt        []TENS instruct                  []IFC  []Premod   []NMES                     []Other:  []w/US   []w/ice   []w/heat  Position:  Location:    []  Traction: [] Cervical       []Lumbar                       [] Prone          []Supine                       []Intermittent   []Continuous Lbs:  [] before manual  [] after manual  []w/heat    []  Ultrasound: []Continuous   [] Pulsed at:                            []1MHz   []3MHz Location:  W/cm2:    []  Paraffin         Location:  []w/heat   5 [x]  Ice     []  Heat  []  Ice massage Position:  Location:    []  Laser  []  Other: Position:  Location:    []  Vasopneumatic Device Pressure:       [] lo [] med [] hi   Temperature:    [x] Skin assessment post-treatment:  [x]intact []redness- no adverse reaction    []redness  adverse reaction:     50 min Therapeutic Exercise:  [x] See flow sheet :   Rationale: increase ROM and increase strength to improve the patients ability to exercise, perform household chores    10 min Manual Therapy:  STM L UT, LS, cervical paraspinals  Suboccipital release x2  Manual L UT stretch   Rationale: decrease pain, increase ROM, increase tissue extensibility and decrease trigger points  to improve the patients ability to exercise            With   [] TE   [] TA   [] neuro   [] other: Patient Education: [x] Review HEP    [] Progressed/Changed HEP based on:   [] positioning   [] body mechanics   [] transfers   [] heat/ice application    [] other:      Other Objective/Functional Measures: n/a     Pain Level (0-10 scale) post treatment: not reported    ASSESSMENT/Changes in Function:   Pt with trigger points L>R UT, LS. Overall tolerated therapy session well. CUes for proper scapular positioning as well as dec'd UT activation during exercises with dec'd symptoms. Updated HEP handout today. Patient will continue to benefit from skilled PT services to modify and progress therapeutic interventions, address functional mobility deficits, address ROM deficits, address strength deficits, analyze and address soft tissue restrictions, analyze and cue movement patterns, analyze and modify body mechanics/ergonomics and assess and modify postural abnormalities to attain remaining goals.      []  See Plan of Care  []  See progress note/recertification  []  See Discharge Summary         Progress towards goals / Updated goals:  nt    PLAN  []  Upgrade activities as tolerated     []  Continue plan of care  []  Update interventions per flow sheet       []  Discharge due to:_  []  Other:_      Corina Feldman, PT 3/28/2018  10:18 AM

## 2018-04-02 ENCOUNTER — HOSPITAL ENCOUNTER (OUTPATIENT)
Dept: PHYSICAL THERAPY | Age: 33
Discharge: HOME | End: 2018-04-02
Payer: COMMERCIAL

## 2018-04-02 PROCEDURE — 97110 THERAPEUTIC EXERCISES: CPT | Performed by: PHYSICAL THERAPY ASSISTANT

## 2018-04-02 PROCEDURE — 97140 MANUAL THERAPY 1/> REGIONS: CPT | Performed by: PHYSICAL THERAPY ASSISTANT

## 2018-04-02 NOTE — PROGRESS NOTES
PT DAILY TREATMENT NOTE 2-15    Patient Name: Viraj Cross  Date:2018  : 1985  [x]  Patient  Verified  Payor: James Velasquez / Plan: Carrington Cerrato PPO / Product Type: PPO /    In time: 5:05 PM Out time: 6:00 PM  Total Treatment Time (min): 55 (55 timed)   Visit #: 4    Treatment Area: Neck pain [M54.2]    SUBJECTIVE  Pain Level (0-10 scale): 0/10  Any medication changes, allergies to medications, adverse drug reactions, diagnosis change, or new procedure performed?: [x] No    [] Yes (see summary sheet for update)  Subjective functional status/changes:   [] No changes reported  Pt states \"other than the tightness last week I've been feeling really good. \" States she went hiking today. No report of cervical pain.     OBJECTIVE    Modality rationale: decrease inflammation and decrease pain to improve the patients ability to perform daily activities; decrease headaches   Min Type Additional Details    [] Estim: []Att   []Unatt        []TENS instruct                  []IFC  []Premod   []NMES                     []Other:  []w/US   []w/ice   []w/heat  Position:  Location:    []  Traction: [] Cervical       []Lumbar                       [] Prone          []Supine                       []Intermittent   []Continuous Lbs:  [] before manual  [] after manual  []w/heat    []  Ultrasound: []Continuous   [] Pulsed at:                            []1MHz   []3MHz Location:  W/cm2:    []  Paraffin         Location:  []w/heat   declined [x]  Ice     []  Heat  []  Ice massage Position:  Location:    []  Laser  []  Other: Position:  Location:    []  Vasopneumatic Device Pressure:       [] lo [] med [] hi   Temperature:    [x] Skin assessment post-treatment:  [x]intact []redness- no adverse reaction    []redness  adverse reaction:     45 min Therapeutic Exercise:  [x] See flow sheet :   Rationale: increase ROM and increase strength to improve the patients ability to exercise, perform household chores    10 min Manual Therapy: STM L UT, LS, cervical paraspinals  Suboccipital release x2  Manual L UT stretch   Rationale: decrease pain, increase ROM, increase tissue extensibility and decrease trigger points  to improve the patients ability to exercise            With   [] TE   [] TA   [] neuro   [] other: Patient Education: [x] Review HEP    [] Progressed/Changed HEP based on:   [] positioning   [] body mechanics   [] transfers   [] heat/ice application    [] other:      Other Objective/Functional Measures: n/a     Pain Level (0-10 scale) post treatment: not reported    ASSESSMENT/Changes in Function:   Modified seated UBE to standing. Significant reduction in tenderness/muscle banding L UT and lev. Scapula muscles. Patient will continue to benefit from skilled PT services to modify and progress therapeutic interventions, address functional mobility deficits, address ROM deficits, address strength deficits, analyze and address soft tissue restrictions, analyze and cue movement patterns, analyze and modify body mechanics/ergonomics and assess and modify postural abnormalities to attain remaining goals. []  See Plan of Care  []  See progress note/recertification  []  See Discharge Summary         Progress towards goals / Updated goals:  nt    PLAN  []  Upgrade activities as tolerated     []  Continue plan of care  []  Update interventions per flow sheet       []  Discharge due to:_  [x]  Other:1 additional visit then d/c to HEP.      Lilian Mack PTA 4/2/2018  5:05 PM

## 2018-04-09 ENCOUNTER — OFFICE VISIT (OUTPATIENT)
Dept: OBGYN CLINIC | Age: 33
End: 2018-04-09

## 2018-04-09 VITALS
BODY MASS INDEX: 31.53 KG/M2 | WEIGHT: 160.6 LBS | HEIGHT: 60 IN | DIASTOLIC BLOOD PRESSURE: 82 MMHG | SYSTOLIC BLOOD PRESSURE: 122 MMHG

## 2018-04-09 DIAGNOSIS — Z11.51 SCREENING FOR HUMAN PAPILLOMAVIRUS: ICD-10-CM

## 2018-04-09 DIAGNOSIS — Z01.419 WELL WOMAN EXAM: Primary | ICD-10-CM

## 2018-04-09 DIAGNOSIS — N89.8 VAGINAL DISCHARGE: ICD-10-CM

## 2018-04-09 NOTE — PROGRESS NOTES
Annual exam    Rakel Hui is a 29 y.o.  A1 presenting for annual exam. Her main concerns today include increase in vaginal discharge with a slight odor. Symptoms present for about 4 weeks. Has used a new soap. Pt previously followed with Dr. Monica Nance at [facility] and was diagnosed with PCOS. Pt with mild hirsuitism and hair growth on chin. At this time, menstrual cycles are regular and monthly, lasting approx 4 days. She does have moliminal symptoms, so is likely ovulatory most months. Reports thyroid was previously abnormal, but more recent thyroid function testing wnl. No other problems or concerns. 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 Ob/Gyn Hx:   A1 -  SAB  LMP- 3/21/18 approximately  Menses- regular monthly cycles? yes, last 4 days, passage of clots? no, intermenstrual spotting? no, Number of pads/tampons per day? 4  Contraception- condoms, declines alternate methods  STI- denies  ? SA- not currently    Health maintenance:  Pap- 1.5 years ago, normal per patient, history of abnormal in high school requiring cryo  Gardasil- completed all three    Past Medical History:   Diagnosis Date    ADHD (attention deficit hyperactivity disorder)     Asthma     Calculus of kidney     Contact dermatitis and other eczema, due to unspecified cause     acne    Kidney infection     PCOS (polycystic ovarian syndrome)        Past Surgical History:   Procedure Laterality Date    HX WISDOM TEETH EXTRACTION         Family History   Problem Relation Age of Onset    Ovarian Cancer Mother     Other Mother      brain tumor    Asthma Brother     Cancer Maternal Grandfather      brain    Alzheimer Maternal Grandfather        Social History     Social History    Marital status: SINGLE     Spouse name: N/A    Number of children: N/A    Years of education: N/A     Occupational History    Not on file.      Social History Main Topics    Smoking status: Never Smoker    Smokeless tobacco: Never Used    Alcohol use 2.0 oz/week     4 Standard drinks or equivalent per week    Drug use: No    Sexual activity: Not Currently     Partners: Male     Birth control/ protection: Condom     Other Topics Concern    Not on file     Social History Narrative       Current Outpatient Prescriptions   Medication Sig Dispense Refill    ibuprofen (ADVIL) 200 mg tablet Take 400 mg by mouth every six (6) hours as needed for Pain.  albuterol (PROVENTIL HFA, VENTOLIN HFA, PROAIR HFA) 90 mcg/actuation inhaler Take 1 Puff by inhalation every four (4) hours as needed for Wheezing. 1 Inhaler 1    montelukast (SINGULAIR) 10 mg tablet TAKE 1 TAB BY MOUTH DAILY. 30 Tab 3    Cetirizine (ZYRTEC) 10 mg cap Take  by mouth.  cyclobenzaprine (FLEXERIL) 10 mg tablet Take 10 mg by mouth as needed for Muscle Spasm(s).          Allergies   Allergen Reactions    Sulfa (Sulfonamide Antibiotics) Rash    Minocycline Other (comments)     Brain swelling       Review of Systems - History obtained from the patient  Constitutional: negative for weight loss, fever, night sweats  HEENT: negative for hearing loss, earache, congestion, snoring, sorethroat  CV: negative for chest pain, palpitations, edema  Resp: negative for cough, shortness of breath, wheezing  GI: negative for change in bowel habits, abdominal pain, black or bloody stools  : negative for frequency, dysuria, hematuria, +vaginal discharge, w/ odor  MSK: negative for back pain, joint pain, muscle pain  Breast: negative for breast lumps, nipple discharge, galactorrhea  Skin :negative for itching, rash, hives  Neuro: negative for dizziness, headache, confusion, weakness  Psych: negative for anxiety, depression, change in mood  Heme/lymph: negative for bleeding, bruising, pallor    Physical Exam    Visit Vitals    /82 (BP 1 Location: Left arm, BP Patient Position: Sitting)    Ht 5' (1.524 m)    Wt 160 lb 9.6 oz (72.8 kg)    LMP 03/21/2018 (Approximate)    BMI 31.37 kg/m2       Constitutional  · Appearance: well-nourished, well developed, alert, in no acute distress    HENT  · Head and Face: appears normal    Neck  · Inspection/Palpation: normal appearance, no masses or tenderness  · Lymph Nodes: no lymphadenopathy present  · Thyroid: gland size normal, nontender, no nodules or masses present on palpation    Chest  · Respiratory Effort: non-labored breathing  · Auscultation: CTAB, normal breath sounds    Cardiovascular  · Heart:  · Auscultation: regular rate and rhythm without murmur  · Extremities: no peripheral edema    Breasts  · Inspection of Breasts: breasts symmetrical, no skin changes, no discharge present, nipple appearance normal, no skin retraction present  · Palpation of Breasts and Axillae: no masses present on palpation, no breast tenderness  · Axillary Lymph Nodes: no lymphadenopathy present    Gastrointestinal  · Abdominal Examination: abdomen non-tender to palpation, normal bowel sounds, no masses present  · Liver and spleen: no hepatomegaly present, spleen not palpable  · Hernias: no hernias identified    Genitourinary  · External Genitalia: normal appearance for age, no discharge present, no tenderness present, no inflammatory lesions present, no masses present, no atrophy present  · Vagina: normal vaginal vault without central or paravaginal defects, small amount of thin white discharge present, no inflammatory lesions present, no masses present  · Bladder: non-tender to palpation  · Urethra: appears normal  · Cervix: normal   · Uterus: normal size, shape and consistency  · Adnexa: no adnexal tenderness present, no adnexal masses present  · Perineum: perineum within normal limits, no evidence of trauma, no rashes or skin lesions present    Skin  · General Inspection: no rash, no lesions identified, multiple tattoos    Neurologic/Psychiatric  · Mental Status:  · Orientation: grossly oriented to person, place and time  · Mood and Affect: mood normal, affect appropriate      Assessment/Plan:  29 y.o. Marcelopreeti Vic with pmh of PCOS presenting for annual exam. Overall doing well.      Health Maintenance:  -counseled re: diet, exercise, healthy lifestyle  -pap/HPV today  -STI screening  -Gardasil series complete  -condoms, declines other hormonal contraception for cycle control    H/o PCOS: mild hirsuitism, regular monthly menses with moliminal symptoms, so likely ovulatory  -records release form signed for Dr. Emilee Guerrero for information about prior work up including labs    Malodorous vaginal discharge:  -nuswab plus sent to r/o vaginitis    RTC: 1 year for AE or sooner malinda Voss MD  4/9/2018  1:59 PM

## 2018-04-09 NOTE — PATIENT INSTRUCTIONS
Pap Test: Care Instructions  Your Care Instructions    The Pap test (also called a Pap smear) is a screening test for cancer of the cervix, which is the lower part of the uterus that opens into the vagina. The test can help your doctor find early changes in the cells that could lead to cancer. The sample of cells taken during your test has been sent to a lab so that an expert can look at the cells. It usually takes a week or two to get the results back. Follow-up care is a key part of your treatment and safety. Be sure to make and go to all appointments, and call your doctor if you are having problems. It's also a good idea to know your test results and keep a list of the medicines you take. What do the results mean? · A normal result means that the test did not find any abnormal cells in the sample. · An abnormal result can mean many things. Most of these are not cancer. The results of your test may be abnormal because:  ¨ You have an infection of the vagina or cervix, such as a yeast infection. ¨ You have an IUD (intrauterine device for birth control). ¨ You have low estrogen levels after menopause that are causing the cells to change. ¨ You have cell changes that may be a sign of precancer or cancer. The results are ranked based on how serious the changes might be. There are many other reasons why you might not get a normal result. If the results were abnormal, you may need to get another test within a few weeks or months. If the results show changes that could be a sign of cancer, you may need a test called a colposcopy, which provides a more complete view of the cervix. Sometimes the lab cannot use the sample because it does not contain enough cells or was not preserved well. If so, you may need to have the test again. This is not common, but it does happen from time to time. When should you call for help?   Watch closely for changes in your health, and be sure to contact your doctor if:  ? · You have vaginal bleeding or pain for more than 2 days after the test. It is normal to have a small amount of bleeding for a day or two after the test.   Where can you learn more? Go to http://radha-eduardo.info/. Enter W084 in the search box to learn more about \"Pap Test: Care Instructions. \"  Current as of: May 12, 2017  Content Version: 11.4  © 7871-9464 [employer]. Care instructions adapted under license by [employer] (which disclaims liability or warranty for this information). If you have questions about a medical condition or this instruction, always ask your healthcare professional. [address] any warranty or liability for your use of this information.

## 2018-04-09 NOTE — MR AVS SNAPSHOT
[address] [address] [facility] 
[phone] Patient: Gris Maria MRN: [id number] :1985 Visit Information Date & Time Provider Department Dept. Phone Encounter #  
 2018  1:00 PM Guillermo Pope MD [address] [facility] [phone] [id number] Your Appointments 2018 10:00 AM  
New Patient with Chucky Murphy, NP  
[address] [facility] [address] [place] [address]) Appt Note: NP- DX Anxiety referred by Dr Marylou Acosta arrive 0930am 3/1/18 [address] [place] [address] [address] [address] [address] [address] [address] [employer] Upcoming Health Maintenance Date Due  
 PAP AKA CERVICAL CYTOLOGY 2019 Allergies as of 2018  Review Complete On: 2018 By: Guillermo Pope MD  
  
 Severity Noted Reaction Type Reactions Sulfa (Sulfonamide Antibiotics) Medium 2014   Side Effect Rash Minocycline  10/31/2013    Other (comments) Brain swelling Current Immunizations  Reviewed on 2016 Name Date Tdap 2016 Not reviewed this visit You Were Diagnosed With   
  
 Codes Comments Well woman exam    -  Primary ICD-10-CM: A77.629 ICD-9-CM: V72.31 Screening for human papillomavirus     ICD-10-CM: Z11.51 
ICD-9-CM: V73.81 Vaginal discharge     ICD-10-CM: N89.8 ICD-9-CM: 623.5 Vitals BP Height(growth percentile) Weight(growth percentile) LMP  
 122/82 (BP 1 Location: Left arm, BP Patient Position: Sitting) 5' (1.524 m) 160 lb 9.6 oz (72.8 kg) 2018 (Approximate) BMI OB Status Smoking Status 31.37 kg/m2 Having regular periods Never Smoker BMI and BSA Data Body Mass Index Body Surface Area  
 31.37 kg/m 2 1.76 m 2 Preferred Pharmacy Pharmacy Name Phone [id number] [place], [address] [place] [phone] Your Updated Medication List  
  
   
This list is accurate as of 4/9/18  1:53 PM.  Always use your most recent med list. ADVIL 200 mg tablet Generic drug:  ibuprofen Take 400 mg by mouth every six (6) hours as needed for Pain. albuterol 90 mcg/actuation inhaler Commonly known as:  PROVENTIL HFA, VENTOLIN HFA, PROAIR HFA Take 1 Puff by inhalation every four (4) hours as needed for Wheezing. cyclobenzaprine 10 mg tablet Commonly known as:  FLEXERIL Take 10 mg by mouth as needed for Muscle Spasm(s). montelukast 10 mg tablet Commonly known as:  SINGULAIR  
TAKE 1 TAB BY MOUTH DAILY. ZyrTEC 10 mg Cap Generic drug:  Cetirizine Take  by mouth. We Performed the Following [address] [id number] Custom] PAP IG, HPV AND RFX HPV 87/46,48([id number]) [[id number] Custom] To-Do List   
 04/10/2018 12:00 PM  
  Appointment with Adalgisa Ni PTA at [facility] ([phone]) Patient Instructions Pap Test: Care Instructions Your Care Instructions The Pap test (also called a Pap smear) is a screening test for cancer of the cervix, which is the lower part of the uterus that opens into the vagina. The test can help your doctor find early changes in the cells that could lead to cancer. The sample of cells taken during your test has been sent to a lab so that an expert can look at the cells. It usually takes a week or two to get the results back. Follow-up care is a key part of your treatment and safety. Be sure to make and go to all appointments, and call your doctor if you are having problems. It's also a good idea to know your test results and keep a list of the medicines you take. What do the results mean? · A normal result means that the test did not find any abnormal cells in the sample. · An abnormal result can mean many things. Most of these are not cancer. The results of your test may be abnormal because: 
¨ You have an infection of the vagina or cervix, such as a yeast infection. ¨ You have an IUD (intrauterine device for birth control). ¨ You have low estrogen levels after menopause that are causing the cells to change. ¨ You have cell changes that may be a sign of precancer or cancer. The results are ranked based on how serious the changes might be. There are many other reasons why you might not get a normal result. If the results were abnormal, you may need to get another test within a few weeks or months. If the results show changes that could be a sign of cancer, you may need a test called a colposcopy, which provides a more complete view of the cervix. Sometimes the lab cannot use the sample because it does not contain enough cells or was not preserved well. If so, you may need to have the test again. This is not common, but it does happen from time to time. When should you call for help? Watch closely for changes in your health, and be sure to contact your doctor if: 
? · You have vaginal bleeding or pain for more than 2 days after the test. It is normal to have a small amount of bleeding for a day or two after the test.  
Where can you learn more? Go to http://radha-eduardo.info/. Enter W695 in the search box to learn more about \"Pap Test: Care Instructions. \" Current as of: May 12, 2017 Content Version: 11.4 © 6481-7348 [employer]. Care instructions adapted under license by Dr. TATTOFF (which disclaims liability or warranty for this information). If you have questions about a medical condition or this instruction, always ask your healthcare professional. [address] any warranty or liability for your use of this information. Introducing [facility]!    
 [employer] introduces WE[employer] patient portal. Now you can access parts of your medical record, email your doctor's office, and request medication refills online. 1. In your internet browser, go to [email or web address] 2. Click on the First Time User? Click Here link in the Sign In box. You will see the New Member Sign Up page. 3. Enter your [employer] Access Code exactly as it appears below. You will not need to use this code after youve completed the sign-up process. If you do not sign up before the expiration date, you must request a new code. · [employer] Access Code: [email or web address] Expires: 4/10/2018 11:45 AM 
 
4. Enter the last four digits of your Social Security Number (xxxx) and Date of Birth (mm/dd/yyyy) as indicated and click Submit. You will be taken to the next sign-up page. 5. Create a [employer] ID. This will be your [employer] login ID and cannot be changed, so think of one that is secure and easy to remember. 6. Create a [employer] password. You can change your password at any time. 7. Enter your Password Reset Question and Answer. This can be used at a later time if you forget your password. 8. Enter your e-mail address. You will receive e-mail notification when new information is available in [address] 9. Click Sign Up. You can now view and download portions of your medical record. 10. Click the Download Summary menu link to download a portable copy of your medical information. If you have questions, please visit the Frequently Asked Questions section of the [employer] website. Remember, [employer] is NOT to be used for urgent needs. For medical emergencies, dial 911. Now available from your iPhone and Android! Please provide this summary of care documentation to your next provider. Your primary care clinician is listed as Bibi Kaur. If you have any questions after today's visit, please call [phone].

## 2018-04-10 ENCOUNTER — HOSPITAL ENCOUNTER (OUTPATIENT)
Dept: PHYSICAL THERAPY | Age: 33
Discharge: HOME | End: 2018-04-10
Payer: COMMERCIAL

## 2018-04-10 PROCEDURE — 97110 THERAPEUTIC EXERCISES: CPT | Performed by: PHYSICAL THERAPIST

## 2018-04-10 NOTE — PROGRESS NOTES
PT DAILY TREATMENT/Progress NOTE 2-15    Patient Name: Alexx Pozo  Date:4/10/2018  : 1985  [x]  Patient  Verified  Payor: Galina Khan / Plan: Vincent Simmons PPO / Product Type: PPO /    In time: 12:00 PM Out time: 12:20 PM  Total Treatment Time (min): 20 (20 timed)   Visit #: 5    Treatment Area: Neck pain [M54.2]    SUBJECTIVE  Pain Level (0-10 scale): 0/10  Any medication changes, allergies to medications, adverse drug reactions, diagnosis change, or new procedure performed?: [x] No    [] Yes (see summary sheet for update)  Subjective functional status/changes:   [] No changes reported  Pt states 80% improvement in symptoms since beginning PT. She states the exercises have really helped and that she has been compliant with HEP. She reports that she continues to have some \"tightness\" throughout neck and shoulders, no pain. She has looked into joining hot house yoga to continue to exercising and stretching. She reports that her posture feels like it is better     OBJECTIVE      20 min Therapeutic Exercise:  [x] See flow sheet : Reassessment performed   Rationale: increase ROM and increase strength to improve the patients ability to exercise, perform household chores          With   [] TE   [] TA   [] neuro   [] other: Patient Education: [x] Review HEP    [] Progressed/Changed HEP based on:   [] positioning   [] body mechanics   [] transfers   [] heat/ice application    [] other:      Other Objective/Functional Measures:   Observation/posture: significantly improved posture compared to initial evaluation. Good alignment of cervical spine     Active Movements:   AROM Degrees Comments:pain, area   Forward flexion 45 No symptoms   Extension 48 No symptoms   Rotation right 63 No symptoms   Rotation left 60 \"just a little tight\"   SB right 30 No symptoms   SB left 21 No symptoms      UE AROM: Full AROM bilat shoulder flex, abduction.  No pain.     Strength (Upper Extremities):    L(0-5) R (0-5)   Shoulder Elevation (C2-4) 5 5   Shoulder Abduction (C5) 4+ 4+   Shoulder Flexion 4+ 4+   Shoulder Scaption 4+ 4+   Elbow Flexion (C6)  5 5   Elbow Extension (C7) 4+ 4+   Wrist Flexion (C7) 5 5   Wrist Extension (C6) 5 5   Thumb Extension (C8) 5 5   Finger Abduction/Inter. (T1) 5 5   No pain with strength testing     Palpation: Mod TTP bilat UT, LS, cervical paraspinals     Outcome Measure: FOTO = 70/100 (61 at evaluation)     Pain Level (0-10 scale) post treatment: 0/10    ASSESSMENT/Changes in Function:   Pt has completed 5 skilled PT visits. She reports 80% improvement in symptoms and scored 9 points higher on functional outcome survey indicating improvement in functional mobility. She demonstrates improved cervical and shoulder posture and improved cervical ROM. She continues to report slight \"tightness\" throughout neck and shoulders and demonstrates mod TTP over musculature. Encouraged pt to continue with HEP and advised her to follow up with massage if needed.  Pt ready for D/C from PT.     []  See Plan of Care  []  See progress note/recertification  []  See Discharge Summary         Progress towards goals / Updated goals:  Short Term Goals:   1) Pt will be independent in initial HEP  MET  2) Pt will report > or = 25% decrease in exacerbation of symptoms MET  3) Pt will report compliance with ice regimen in order to manage symptomsMET     Long Term Goals:   1) Pt will perform cervical AROM all planes with < or =1/10 pain in order to perform household chores/daily activities MET  2) Pt will improve FOTO score to > or =81/100 in order to demonstrate improvement in functional mobility not met    PLAN  D/C to [facility], PT 4/10/2018  12:04 PM

## 2018-04-12 LAB
A VAGINAE DNA VAG QL NAA+PROBE: ABNORMAL SCORE
BVAB2 DNA VAG QL NAA+PROBE: ABNORMAL SCORE
C ALBICANS DNA VAG QL NAA+PROBE: POSITIVE
C GLABRATA DNA VAG QL NAA+PROBE: NEGATIVE
C TRACH RRNA SPEC QL NAA+PROBE: NEGATIVE
CYTOLOGIST CVX/VAG CYTO: NORMAL
CYTOLOGY CVX/VAG DOC THIN PREP: NORMAL
DX ICD CODE: NORMAL
DX ICD CODE: NORMAL
HPV I/H RISK 1 DNA CVX QL PROBE+SIG AMP: NEGATIVE
Lab: NORMAL
MEGA1 DNA VAG QL NAA+PROBE: ABNORMAL SCORE
N GONORRHOEA RRNA SPEC QL NAA+PROBE: NEGATIVE
OTHER STN SPEC: NORMAL
PATH REPORT.FINAL DX SPEC: NORMAL
STAT OF ADQ CVX/VAG CYTO-IMP: NORMAL
T VAGINALIS RRNA SPEC QL NAA+PROBE: NEGATIVE

## 2018-04-12 RX ORDER — FLUCONAZOLE 150 MG/1
150 TABLET ORAL DAILY
Qty: 1 TAB | Refills: 1 | Status: SHIPPED | OUTPATIENT
Start: 2018-04-12 | End: 2018-04-13

## 2018-04-12 NOTE — PROGRESS NOTES
+ yeast. Please inform patient and send Rx for diflucan 150mg PO x1 (with 1 refill) to patient's pharmacy. Thanks!

## 2018-04-12 NOTE — PROGRESS NOTES
Attempted to contact patient. Left voicemail message with results and recommendations. Instructed to call with any questions.

## 2018-04-13 NOTE — ANCILLARY DISCHARGE INSTRUCTIONS
[facility]  [address]  [place], [address]  Phone: [phone]  Fax: [phone]    Discharge Summary  2-15    Patient name: Jaida Jimenez  : 1985  Provider#:[id number]  Referral source: Philippe Dixon MD      Medical/Treatment Diagnosis: Neck pain [M54.2]     Prior Hospitalization: see medical history     Comorbidities: see evaluation  Prior Level of Function:see evaluation  Medications: Verified on Patient Summary List    Start of Care: 3/6/18      Onset Date:see evaluation   Visits from Start of Care: 5     Missed Visits: see CC  Reporting Period : 3/6/18 to 4/10/18    Summary of care:   Observation/posture: significantly improved posture compared to initial evaluation. Good alignment of cervical spine      Active Movements:   AROM Degrees Comments:pain, area   Forward flexion 45 No symptoms   Extension 48 No symptoms   Rotation right 63 No symptoms   Rotation left 60 \"just a little tight\"   SB right 30 No symptoms   SB left 21 No symptoms       UE AROM: Full AROM bilat shoulder flex, abduction. No pain.      Strength (Upper Extremities):     L(0-5) R (0-5)   Shoulder Elevation (C2-4) 5 5   Shoulder Abduction (C5) 4+ 4+   Shoulder Flexion 4+ 4+   Shoulder Scaption 4+ 4+   Elbow Flexion (C6)  5 5   Elbow Extension (C7) 4+ 4+   Wrist Flexion (C7) 5 5   Wrist Extension (C6) 5 5   Thumb Extension (C8) 5 5   Finger Abduction/Inter.  (T1) 5 5   No pain with strength testing      Palpation: Mod TTP bilat UT, LS, cervical paraspinals      Outcome Measure: FOTO = 70/100 (61 at evaluation)                          Pain Level (0-10 scale) post treatment: 0/10     Progress towards goals  Short Term Goals:   1) Pt will be independent in initial HEP  MET  2) Pt will report > or = 25% decrease in exacerbation of symptoms MET  3) Pt will report compliance with ice regimen in order to manage symptomsMET      Long Term Goals:   1) Pt will perform cervical AROM all planes with < or =1/10 pain in order to perform household chores/daily activities MET  2) Pt will improve FOTO score to > or =81/100 in order to demonstrate improvement in functional mobility not met    ASSESSMENT  Pt has completed 5 skilled PT visits. She reports 80% improvement in symptoms and scored 9 points higher on functional outcome survey indicating improvement in functional mobility. She demonstrates improved cervical and shoulder posture and improved cervical ROM. She continues to report slight \"tightness\" throughout neck and shoulders and demonstrates mod TTP over musculature. Encouraged pt to continue with HEP and advised her to follow up with massage if needed. Pt ready for D/C from PT.     RECOMMENDATIONS:  [x]Discontinue therapy: [x]Patient has reached or is progressing toward set goals      []Patient is non-compliant or has abdicated      []Due to lack of appreciable progress towards set [address], PT 4/13/2018 1:35 PM

## 2018-10-11 ENCOUNTER — OFFICE VISIT (OUTPATIENT)
Dept: INTERNAL MEDICINE CLINIC | Age: 33
End: 2018-10-11

## 2018-10-11 VITALS
WEIGHT: 156.6 LBS | OXYGEN SATURATION: 99 % | RESPIRATION RATE: 14 BRPM | TEMPERATURE: 98.4 F | DIASTOLIC BLOOD PRESSURE: 66 MMHG | HEIGHT: 60 IN | SYSTOLIC BLOOD PRESSURE: 118 MMHG | BODY MASS INDEX: 30.74 KG/M2 | HEART RATE: 88 BPM

## 2018-10-11 DIAGNOSIS — R59.9 PALPABLE LYMPH NODE: Primary | ICD-10-CM

## 2018-10-11 NOTE — MR AVS SNAPSHOT
[address] [facility], [address] [place] [address] 
[phone] Patient: Pola Cabrera MRN: [id number] :1985 Visit Information Date & Time Provider Department Dept. Phone Encounter #  
 10/11/2018 11:40 AM Smita Caro MD [place] [address] [facility] [phone] [id number] Follow-up Instructions Return schedule physical after 1/10/19, for annual exam. Upcoming Health Maintenance Date Due Influenza Age 5 to Adult 2018 PAP AKA CERVICAL CYTOLOGY 2021 DTaP/Tdap/Td series (2 - Td) 2026 Allergies as of 10/11/2018  Review Complete On: 10/11/2018 By: Smita Caro MD  
  
 Severity Noted Reaction Type Reactions Sulfa (Sulfonamide Antibiotics) Medium 2014   Side Effect Rash Minocycline  10/31/2013    Other (comments) Brain swelling Current Immunizations  Reviewed on 2016 Name Date Tdap 2016 Not reviewed this visit Vitals BP Pulse Temp Resp Height(growth percentile) Weight(growth percentile)  
 118/66 (BP 1 Location: Left arm, BP Patient Position: Sitting) 88 98.4 °F (36.9 °C) (Oral) 14 5' (1.524 m) 156 lb 9.6 oz (71 kg) LMP SpO2 BMI OB Status Smoking Status 2018 (Approximate) 99% 30.58 kg/m2 Having regular periods Never Smoker Vitals History BMI and BSA Data Body Mass Index Body Surface Area 30.58 kg/m 2 1.73 m 2 Preferred Pharmacy Pharmacy Name Phone [id number] [place], [address] Nicki Vega [phone] Your Updated Medication List  
  
   
This list is accurate as of 10/11/18 12:26 PM.  Always use your most recent med list. ADVIL 200 mg tablet Generic drug:  ibuprofen Take 400 mg by mouth every six (6) hours as needed for Pain. albuterol 90 mcg/actuation inhaler Commonly known as:  PROVENTIL HFA, VENTOLIN HFA, PROAIR HFA  
 Take 1 Puff by inhalation every four (4) hours as needed for Wheezing.  
  
 montelukast 10 mg tablet Commonly known as:  SINGULAIR  
TAKE 1 TAB BY MOUTH DAILY. ZyrTEC 10 mg Cap Generic drug:  Cetirizine Take  by mouth. Follow-up Instructions Return schedule physical after 1/10/19, for annual exam.  
  
  
Introducing [facility]! [address] introduces [employer] patient portal. Now you can access parts of your medical record, email your doctor's office, and request medication refills online. 1. In your internet browser, go to [email or web address] 2. Click on the First Time User? Click Here link in the Sign In box. You will see the New Member Sign Up page. 3. Enter your [employer] Access Code exactly as it appears below. You will not need to use this code after youve completed the sign-up process. If you do not sign up before the expiration date, you must request a new code. · [employer] Access Code: [email or web address] Expires: 11/2/2018  5:19 AM 
 
4. Enter the last four digits of your Social Security Number (xxxx) and Date of Birth (mm/dd/yyyy) as indicated and click Submit. You will be taken to the next sign-up page. 5. Create a C[employer]nea ID. This will be your [employer] login ID and cannot be changed, so think of one that is secure and easy to remember. 6. Create a [employer] password. You can change your password at any time. 7. Enter your Password Reset Question and Answer. This can be used at a later time if you forget your password. 8. Enter your e-mail address. You will receive e-mail notification when new information is available in [address] 9. Click Sign Up. You can now view and download portions of your medical record. 10. Click the Download Summary menu link to download a portable copy of your medical information.  
 
If you have questions, please visit the Frequently Asked Questions section of the [facility]. Remember, [facility] is NOT to be used for urgent needs. For medical emergencies, dial 911. Now available from your iPhone and Android! Please provide this summary of care documentation to your next provider. Your primary care clinician is listed as Bibi Kaur. If you have any questions after today's visit, please call [phone].

## 2018-10-11 NOTE — PROGRESS NOTES
Subjective:      Salena Flores is a 29 y.o. female who presents today with concerns of lumps she noted on the left side of her neck a week ago. No significant changes. No pain. She does have allergy symptoms. Patient Active Problem List   Diagnosis Code    Nephrolithiasis N20.0    Allergic rhinitis J30.9    Pap smear for cervical cancer screening Z12.4    PCOS (polycystic ovarian syndrome) E28.2    Allergic asthma without complication K84.635    Acquired hypothyroidism E03.9     Current Outpatient Prescriptions   Medication Sig Dispense Refill    ibuprofen (ADVIL) 200 mg tablet Take 400 mg by mouth every six (6) hours as needed for Pain.  albuterol (PROVENTIL HFA, VENTOLIN HFA, PROAIR HFA) 90 mcg/actuation inhaler Take 1 Puff by inhalation every four (4) hours as needed for Wheezing. 1 Inhaler 1    montelukast (SINGULAIR) 10 mg tablet TAKE 1 TAB BY MOUTH DAILY. 30 Tab 3    Cetirizine (ZYRTEC) 10 mg cap Take  by mouth. Review of Systems    Pertinent items are noted in HPI. Objective:     Visit Vitals    /66 (BP 1 Location: Left arm, BP Patient Position: Sitting)    Pulse 88    Temp 98.4 °F (36.9 °C) (Oral)    Resp 14    Ht 5' (1.524 m)    Wt 156 lb 9.6 oz (71 kg)    LMP 09/26/2018 (Approximate)    SpO2 99%    BMI 30.58 kg/m2     General appearance: alert, cooperative, no distress, appears stated age  Head: Normocephalic, without obvious abnormality, atraumatic  Ears: normal TM's and external ear canals AU  Nose: Nares normal. Septum midline. Mucosa normal. No drainage or sinus tenderness. Throat: Lips, mucosa, and tongue normal. Teeth and gums normal and abnormal findings: mild oropharyngeal erythema  Neck: supple, symmetrical, trachea midline, no carotid bruit and no JVD. Palpable lymph nodes bilateral cervical chains. None enlarged  Lungs: clear to auscultation bilaterally    Assessment/Plan:       ICD-10-CM ICD-9-CM    1.  Palpable lymph node R59.9 785.6 Plan:  -with her chronic allergy symptoms, her cervical lymph nodes may periodically be active. Normal exam today. Will continue to monitor. Follow-up Disposition:     Return if symptoms worsen or fail to improve. Advised patient to call back or return to office if symptoms worsen/change/persist.     Discussed expected course/resolution/complications of diagnosis in detail with patient. Medication risks/benefits/costs/interactions/alternatives discussed with patient. Patient was given an after visit summary which includes diagnoses, current medications, & vitals. Patient expressed understanding with the diagnosis and plan.

## 2018-10-11 NOTE — PROGRESS NOTES
Chief Complaint   Patient presents with    Gland Swelling     Bumps on her lymphnodes on the left side     1. Have you been to the ER, urgent care clinic since your last visit? Hospitalized since your last visit? No    2. Have you seen or consulted any other health care providers outside of the [address] [place] [address] since your last visit? Include any pap smears or colon screening.  Yes Dr. Tyrone Baker dermatologist 10/3/2018      Flu vaccine-Pt refused

## 2019-01-16 ENCOUNTER — OFFICE VISIT (OUTPATIENT)
Dept: INTERNAL MEDICINE CLINIC | Age: 34
End: 2019-01-16

## 2019-01-16 VITALS
HEART RATE: 73 BPM | SYSTOLIC BLOOD PRESSURE: 110 MMHG | WEIGHT: 161 LBS | BODY MASS INDEX: 31.61 KG/M2 | TEMPERATURE: 98.6 F | OXYGEN SATURATION: 99 % | RESPIRATION RATE: 19 BRPM | HEIGHT: 60 IN | DIASTOLIC BLOOD PRESSURE: 78 MMHG

## 2019-01-16 DIAGNOSIS — Z00.00 ANNUAL PHYSICAL EXAM: Primary | ICD-10-CM

## 2019-01-16 NOTE — PROGRESS NOTES
Verified name and birth date for privacy precautions. Chart reviewed in preparation for today's visit. Chief Complaint Patient presents with  Complete Physical  
  not fasting. There are no preventive care reminders to display for this patient. Wt Readings from Last 3 Encounters:  
01/16/19 161 lb (73 kg) 10/11/18 156 lb 9.6 oz (71 kg) 04/09/18 160 lb 9.6 oz (72.8 kg) Temp Readings from Last 3 Encounters:  
01/16/19 98.6 °F (37 °C) (Oral) 10/11/18 98.4 °F (36.9 °C) (Oral) 02/22/18 98.9 °F (37.2 °C) (Oral) BP Readings from Last 3 Encounters:  
01/16/19 110/78  
10/11/18 118/66  
04/09/18 122/82 Pulse Readings from Last 3 Encounters:  
01/16/19 73  
10/11/18 88  
02/22/18 76 Learning Assessment: 
:  
 
Learning Assessment 1/10/2018 3/23/2015 10/31/2013 PRIMARY LEARNER Patient Patient Patient HIGHEST LEVEL OF EDUCATION - PRIMARY LEARNER  SOME COLLEGE 4 YEARS OF COLLEGE -  
BARRIERS PRIMARY LEARNER NONE NONE -  
CO-LEARNER CAREGIVER No No - PRIMARY LANGUAGE ENGLISH ENGLISH ENGLISH  NEED - No -  
LEARNER PREFERENCE PRIMARY PICTURES DEMONSTRATION DEMONSTRATION  
  VIDEOS - -  
LEARNING SPECIAL TOPICS - no -  
ANSWERED BY patient patient patient RELATIONSHIP SELF SELF SELF Depression Screening: 
:  
 
PHQ over the last two weeks 1/10/2018 Little interest or pleasure in doing things Not at all Feeling down, depressed, irritable, or hopeless Not at all Total Score PHQ 2 0 Fall Risk Assessment: 
:  
 
No flowsheet data found. Abuse Screening: 
:  
 
Abuse Screening Questionnaire 1/10/2018 11/2/2015 12/17/2014 Do you ever feel afraid of your partner? N N N Are you in a relationship with someone who physically or mentally threatens you? N N N Is it safe for you to go home? Telly Ureña Coordination of Care Questionnaire: 
:  
 
1) Have you been to an emergency room, urgent care clinic since your last visit? no  
 Hospitalized since your last visit? no          
 
2) Have you seen or consulted any other health care providers outside of [address] [place] [address] since your last visit? no  (Include any pap smears or colon screenings in this section.) 3) Do you have an Advance Directive on file? no 
 
------------------------------------------------

## 2019-01-16 NOTE — PROGRESS NOTES
Subjective:  
  
Cal Velarde is a 34 y.o. female who presents today for her annual exam.  
 
Marko Morris exam - Dr. Deirdre Gaona. Fermin Voorhees - 4/9/18 Dr. Juliette Alvarenga is her allergist. 
 
No new concerns at this time. Patient Active Problem List  
Diagnosis Code  Nephrolithiasis N20.0  Allergic rhinitis J30.9  Pap smear for cervical cancer screening Z12.4  PCOS (polycystic ovarian syndrome) E28.2  Allergic asthma without complication Q75.094  Acquired hypothyroidism E03.9 Current Outpatient Medications Medication Sig Dispense Refill  ibuprofen (ADVIL) 200 mg tablet Take 400 mg by mouth every six (6) hours as needed for Pain.  albuterol (PROVENTIL HFA, VENTOLIN HFA, PROAIR HFA) 90 mcg/actuation inhaler Take 1 Puff by inhalation every four (4) hours as needed for Wheezing. 1 Inhaler 1  
 Cetirizine (ZYRTEC) 10 mg cap Take  by mouth.  montelukast (SINGULAIR) 10 mg tablet TAKE 1 TAB BY MOUTH DAILY. 30 Tab 3 Review of Systems A comprehensive review of systems was negative except for that written in the HPI. Objective: Wt Readings from Last 3 Encounters:  
01/16/19 161 lb (73 kg) 10/11/18 156 lb 9.6 oz (71 kg) 04/09/18 160 lb 9.6 oz (72.8 kg) Temp Readings from Last 3 Encounters:  
01/16/19 98.6 °F (37 °C) (Oral) 10/11/18 98.4 °F (36.9 °C) (Oral) 02/22/18 98.9 °F (37.2 °C) (Oral) BP Readings from Last 3 Encounters:  
01/16/19 110/78  
10/11/18 118/66  
04/09/18 122/82 Pulse Readings from Last 3 Encounters:  
01/16/19 73  
10/11/18 88  
02/22/18 76 Visit Vitals /78 (BP 1 Location: Left arm, BP Patient Position: Sitting) Pulse 73 Temp 98.6 °F (37 °C) (Oral) Resp 19 Ht 5' (1.524 m) Wt 161 lb (73 kg) LMP 12/22/2018 (Approximate) SpO2 99% BMI 31.44 kg/m² General:  Alert, cooperative, no distress, appears stated age. Head:  Normocephalic, without obvious abnormality, atraumatic. Eyes:  Conjunctivae/corneas clear. PERRL, EOMs intact. Ears:  Normal TMs and external ear canals both ears. Nose: Nares normal. Septum midline. Mucosa normal. No drainage or sinus tenderness. Throat: Lips, mucosa, and tongue normal. Teeth and gums normal.  
Neck: Supple, symmetrical, trachea midline, no adenopathy, thyroid: no enlargement/tenderness/nodules, no carotid bruit and no JVD. Back:   Symmetric, no curvature. ROM normal. No CVA tenderness. Lungs:   Clear to auscultation bilaterally. Chest wall:  No tenderness or deformity. Heart:  Regular rate and rhythm, S1, S2 normal, no murmur, click, rub or gallop. Breast Exam:  No tenderness, masses, or nipple abnormality. Abdomen:   Soft, non-tender. Bowel sounds normal. No masses,  No organomegaly. Extremities: Extremities normal, atraumatic, no cyanosis or edema. Pulses: 2+ and symmetric all extremities. Skin: Skin color, texture, turgor normal. No rashes or lesions. Lymph nodes: Cervical, supraclavicular, and axillary nodes normal.  
Neurologic: CNII-XII intact. Normal strength, sensation Assessment/Plan: 1. Annual physical exam 
-up to date with gyn exam 
-up to date with immunizations. Declines flu vaccine.  
-checking TSH with history of underactive thyroid with use of levothyroxine.  
 
- CBC WITH AUTOMATED DIFF 
- METABOLIC PANEL, COMPREHENSIVE 
- LIPID PANEL 
- TSH 3RD GENERATION 
- UA/M W/RFLX CULTURE, ROUTINE Follow-up Disposition:  
 
Follow up yearly Return if symptoms worsen or fail to improve. Advised patient to call back or return to office if symptoms worsen/change/persist.  
 
Discussed expected course/resolution/complications of diagnosis in detail with patient. Medication risks/benefits/costs/interactions/alternatives discussed with patient. Patient was given an after visit summary which includes diagnoses, current medications, & vitals. Patient expressed understanding with the diagnosis and plan.

## 2019-04-19 ENCOUNTER — OFFICE VISIT (OUTPATIENT)
Dept: INTERNAL MEDICINE CLINIC | Age: 34
End: 2019-04-19

## 2019-04-19 VITALS
HEART RATE: 75 BPM | DIASTOLIC BLOOD PRESSURE: 80 MMHG | TEMPERATURE: 98.2 F | SYSTOLIC BLOOD PRESSURE: 112 MMHG | HEIGHT: 60 IN | BODY MASS INDEX: 32.55 KG/M2 | RESPIRATION RATE: 18 BRPM | WEIGHT: 165.8 LBS | OXYGEN SATURATION: 98 %

## 2019-04-19 DIAGNOSIS — F98.8 ATTENTION DEFICIT DISORDER, UNSPECIFIED HYPERACTIVITY PRESENCE: Primary | ICD-10-CM

## 2019-04-19 DIAGNOSIS — S39.012S STRAIN OF LUMBAR REGION, SEQUELA: ICD-10-CM

## 2019-04-19 DIAGNOSIS — J45.909 ASTHMA DUE TO ENVIRONMENTAL ALLERGIES: ICD-10-CM

## 2019-04-19 DIAGNOSIS — Z79.899 ENCOUNTER FOR LONG-TERM (CURRENT) USE OF MEDICATIONS: ICD-10-CM

## 2019-04-19 RX ORDER — DEXTROAMPHETAMINE SACCHARATE, AMPHETAMINE ASPARTATE MONOHYDRATE, DEXTROAMPHETAMINE SULFATE AND AMPHETAMINE SULFATE 2.5; 2.5; 2.5; 2.5 MG/1; MG/1; MG/1; MG/1
10 CAPSULE, EXTENDED RELEASE ORAL
Qty: 30 CAP | Refills: 0 | Status: SHIPPED | OUTPATIENT
Start: 2019-04-19 | End: 2019-05-16 | Stop reason: SDUPTHER

## 2019-04-19 RX ORDER — MONTELUKAST SODIUM 10 MG/1
TABLET ORAL
Qty: 90 TAB | Refills: 1 | Status: SHIPPED | OUTPATIENT
Start: 2019-04-19 | End: 2019-08-11 | Stop reason: SDUPTHER

## 2019-04-19 NOTE — PROGRESS NOTES
Reviewed record in preparation for visit and have obtained necessary documentation. Identified pt with two pt identifiers(name and ). Health Maintenance Due Topic  Pneumococcal 0-64 years (1 of 1 - PPSV23) Chief Complaint Patient presents with  Medication Evaluation Anxiety Wt Readings from Last 3 Encounters:  
19 165 lb 12.8 oz (75.2 kg) 19 161 lb (73 kg) 10/11/18 156 lb 9.6 oz (71 kg) Temp Readings from Last 3 Encounters:  
19 98.6 °F (37 °C) (Oral) 10/11/18 98.4 °F (36.9 °C) (Oral) 18 98.9 °F (37.2 °C) (Oral) BP Readings from Last 3 Encounters:  
19 110/78  
10/11/18 118/66  
18 122/82 Pulse Readings from Last 3 Encounters:  
19 73  
10/11/18 88  
18 76 Learning Assessment: 
:  
 
Learning Assessment 2019 1/10/2018 3/23/2015 10/31/2013 PRIMARY LEARNER Patient Patient Patient Patient HIGHEST LEVEL OF EDUCATION - PRIMARY LEARNER  SOME COLLEGE SOME COLLEGE 4 YEARS OF COLLEGE -  
BARRIERS PRIMARY LEARNER NONE NONE NONE -  
CO-LEARNER CAREGIVER - No No - PRIMARY LANGUAGE ENGLISH ENGLISH ENGLISH ENGLISH  NEED - - No -  
LEARNER PREFERENCE PRIMARY DEMONSTRATION PICTURES DEMONSTRATION DEMONSTRATION  
  - VIDEOS - -  
LEARNING SPECIAL TOPICS - - no -  
ANSWERED BY patient patient patient patient RELATIONSHIP SELF SELF SELF SELF Depression Screening: 
:  
 
3 most recent PHQ Screens 2019 Little interest or pleasure in doing things Not at all Feeling down, depressed, irritable, or hopeless Not at all Total Score PHQ 2 0 Fall Risk Assessment: 
:  
 
No flowsheet data found. Abuse Screening: 
:  
 
Abuse Screening Questionnaire 2019 1/10/2018 2015 2014 Do you ever feel afraid of your partner? N N N N Are you in a relationship with someone who physically or mentally threatens you? N N N N Is it safe for you to go home? - Y Y Y  
 
 
 Coordination of Care Questionnaire: 
:  
 
1) Have you been to an emergency room, urgent care clinic since your last visit? no  
Hospitalized since your last visit? no          
 
2) Have you seen or consulted any other health care providers outside of [address] [place] [address] since your last visit? no  (Include any pap smears or colon screenings in this section.) 3) Do you have an Advance Directive on file? no 
 
4) Are you interested in receiving information on Advance Directives? NO Patient is accompanied by self I have received verbal consent from Diana Davis to discuss any/all medical information while they are present in the room.

## 2019-04-19 NOTE — PROGRESS NOTES
Subjective:  
  
Natacha Mckenzie is a 34 y.o. female who presents today to discuss several issues. Low back strain - she has hx of recurrent low back strain. Has had PT in the past.  Exercising, but still can feel twinges. ADD - has been tested in the past and diagnosed ADD. She has tried adderall and vyvanse in the past.  She does not like being on medication, but she is considering going back to school and is finding it difficult to sometimes to organize at work. Allergies- has seasonal allergies and needs refill of her singulair Patient Active Problem List  
Diagnosis Code  Nephrolithiasis N20.0  Allergic rhinitis J30.9  Pap smear for cervical cancer screening Z12.4  PCOS (polycystic ovarian syndrome) E28.2  Allergic asthma without complication M68.208  Acquired hypothyroidism E03.9  Attention deficit disorder (ADD) without hyperactivity F98.8 Current Outpatient Medications Medication Sig Dispense Refill  amphetamine-dextroamphetamine XR (ADDERALL XR) 10 mg XR capsule Take 1 Cap by mouth every morning. Max Daily Amount: 10 mg. 30 Cap 0  
 montelukast (SINGULAIR) 10 mg tablet TAKE 1 TAB BY MOUTH DAILY. 90 Tab 1  ibuprofen (ADVIL) 200 mg tablet Take 400 mg by mouth every six (6) hours as needed for Pain.  albuterol (PROVENTIL HFA, VENTOLIN HFA, PROAIR HFA) 90 mcg/actuation inhaler Take 1 Puff by inhalation every four (4) hours as needed for Wheezing. 1 Inhaler 1  
 Cetirizine (ZYRTEC) 10 mg cap Take  by mouth. Review of Systems Pertinent items are noted in HPI. Objective:  
 
Visit Vitals /80 (BP 1 Location: Left arm, BP Patient Position: Sitting) Pulse 75 Temp 98.2 °F (36.8 °C) (Oral) Resp 18 Ht 5' (1.524 m) Wt 165 lb 12.8 oz (75.2 kg) LMP 04/14/2019 (Approximate) SpO2 98% BMI 32.38 kg/m² General appearance: alert, cooperative, no distress, appears stated age Neurologic: Mental status: Alert, oriented, thought content appropriate, affect: stable Assessment/Plan: 1. Attention deficit disorder, unspecified hyperactivity presence 
-trial of adderall 
 
- amphetamine-dextroamphetamine XR (ADDERALL XR) 10 mg XR capsule; Take 1 Cap by mouth every morning. Max Daily Amount: 10 mg. Dispense: 30 Cap; Refill: 0 
 
2. Asthma due to environmental allergies 
-restart singulair 3. Strain of lumbar region, sequela 
-recommended PT and continuing exercises taught through PT. 
 
- REFERRAL TO PHYSICAL THERAPY 4. Encounter for long-term (current) use of medications Orders Placed This Encounter  REFERRAL TO PHYSICAL THERAPY Referral Priority:   Routine Referral Type:   PT/OT/ST Referral Reason:   Specialty Services Required Number of Visits Requested:   1  
 amphetamine-dextroamphetamine XR (ADDERALL XR) 10 mg XR capsule Sig: Take 1 Cap by mouth every morning. Max Daily Amount: 10 mg. Dispense:  30 Cap Refill:  0  
 montelukast (SINGULAIR) 10 mg tablet Sig: TAKE 1 TAB BY MOUTH DAILY. Dispense:  90 Tab Refill:  1 Follow-up Disposition:  
 
follow up 1 month Return if symptoms worsen or fail to improve. Advised patient to call back or return to office if symptoms worsen/change/persist.  
 
Discussed expected course/resolution/complications of diagnosis in detail with patient. Medication risks/benefits/costs/interactions/alternatives discussed with patient. Patient was given an after visit summary which includes diagnoses, current medications, & vitals. Patient expressed understanding with the diagnosis and plan.

## 2019-05-16 DIAGNOSIS — F98.8 ATTENTION DEFICIT DISORDER, UNSPECIFIED HYPERACTIVITY PRESENCE: ICD-10-CM

## 2019-05-16 NOTE — TELEPHONE ENCOUNTER
Pt called states that she is leaving on 5/18/19  Going [place] and needs a early refill on her   Requested Prescriptions     Pending Prescriptions Disp Refills    amphetamine-dextroamphetamine XR (ADDERALL XR) 10 mg XR capsule 30 Cap 0     Sig: Take 1 Cap by mouth every morning. Max Daily Amount: 10 mg.    LOV 4/19/19 NOV 5/23/19    Best contact number for pt is [phone]

## 2019-05-16 NOTE — TELEPHONE ENCOUNTER
Last office visit - 4/19/2019  Next office visit -   Future Appointments   Date Time Provider Julito Sanchezi   5/23/2019 12:20 PM MD YOON Logan   1/17/2020 12:40 PM MD YOON Logan [address]         Requested Prescriptions     Pending Prescriptions Disp Refills    amphetamine-dextroamphetamine XR (ADDERALL XR) 10 mg XR capsule 30 Cap 0     Sig: Take 1 Cap by mouth every morning. Max Daily Amount: 10 mg.

## 2019-05-17 NOTE — TELEPHONE ENCOUNTER
Patient called to check the status of this refill request. She needs to pick this script up today, she will be going out of the country tomorrow.

## 2019-05-20 RX ORDER — DEXTROAMPHETAMINE SACCHARATE, AMPHETAMINE ASPARTATE MONOHYDRATE, DEXTROAMPHETAMINE SULFATE AND AMPHETAMINE SULFATE 2.5; 2.5; 2.5; 2.5 MG/1; MG/1; MG/1; MG/1
10 CAPSULE, EXTENDED RELEASE ORAL
Qty: 30 CAP | Refills: 0 | Status: SHIPPED | OUTPATIENT
Start: 2019-05-20 | End: 2020-01-20 | Stop reason: SDUPTHER

## 2019-05-21 NOTE — TELEPHONE ENCOUNTER
Left generic VM advising Ms. Donaldo Elizabeth that her Rx refill has been completed and is ready for pickup from our front office staff at her convenience. Carito Schaefer was advised the office is open Monday through Friday 8AM until 5PM & requested she bring in valid ID to obtain Rx.

## 2019-08-11 RX ORDER — MONTELUKAST SODIUM 10 MG/1
TABLET ORAL
Qty: 90 TAB | Refills: 1 | Status: SHIPPED | OUTPATIENT
Start: 2019-08-11 | End: 2020-09-14 | Stop reason: SDUPTHER

## 2020-01-17 ENCOUNTER — OFFICE VISIT (OUTPATIENT)
Dept: INTERNAL MEDICINE CLINIC | Age: 35
End: 2020-01-17

## 2020-01-17 VITALS
OXYGEN SATURATION: 99 % | TEMPERATURE: 97.8 F | HEIGHT: 60 IN | HEART RATE: 66 BPM | WEIGHT: 169.2 LBS | BODY MASS INDEX: 33.22 KG/M2 | DIASTOLIC BLOOD PRESSURE: 71 MMHG | RESPIRATION RATE: 19 BRPM | SYSTOLIC BLOOD PRESSURE: 113 MMHG

## 2020-01-17 DIAGNOSIS — F98.8 ATTENTION DEFICIT DISORDER (ADD) WITHOUT HYPERACTIVITY: ICD-10-CM

## 2020-01-17 DIAGNOSIS — Z00.00 ANNUAL PHYSICAL EXAM: Primary | ICD-10-CM

## 2020-01-17 DIAGNOSIS — F98.8 ATTENTION DEFICIT DISORDER, UNSPECIFIED HYPERACTIVITY PRESENCE: ICD-10-CM

## 2020-01-17 DIAGNOSIS — E04.9 THYROID ENLARGED: ICD-10-CM

## 2020-01-17 NOTE — PROGRESS NOTES
Subjective:      Celi Montgomery is a 30 y.o. female who presents today for follow up of her Annual exam and ADD. Her gyn care is done with Dr. Dolores Deleon. Flaquito May - last seen 4/9/18    Tired, not motivated to get up. Feeling never satisfied. She feels that her thyroid is getting bigger. Patient Active Problem List   Diagnosis Code    Nephrolithiasis N20.0    Allergic rhinitis J30.9    Pap smear for cervical cancer screening Z12.4    PCOS (polycystic ovarian syndrome) E28.2    Allergic asthma without complication Q16.298    Acquired hypothyroidism E03.9    Attention deficit disorder (ADD) without hyperactivity F98.8     Current Outpatient Medications   Medication Sig Dispense Refill    montelukast (SINGULAIR) 10 mg tablet TAKE 1 TABLET BY MOUTH EVERY DAY 90 Tab 1    amphetamine-dextroamphetamine XR (ADDERALL XR) 10 mg XR capsule Take 1 Cap by mouth every morning. Max Daily Amount: 10 mg. 30 Cap 0    ibuprofen (ADVIL) 200 mg tablet Take 400 mg by mouth every six (6) hours as needed for Pain.  albuterol (PROVENTIL HFA, VENTOLIN HFA, PROAIR HFA) 90 mcg/actuation inhaler Take 1 Puff by inhalation every four (4) hours as needed for Wheezing. 1 Inhaler 1    Cetirizine (ZYRTEC) 10 mg cap Take  by mouth. Review of Systems    Pertinent items are noted in HPI. Objective:     Visit Vitals  /71 (BP 1 Location: Left arm, BP Patient Position: Sitting)   Pulse 66   Temp 97.8 °F (36.6 °C) (Oral)   Resp 19   Ht 5' (1.524 m)   Wt 169 lb 3.2 oz (76.7 kg)   LMP 01/03/2020   SpO2 99%   BMI 33.04 kg/m²     General:  Alert, cooperative, no distress, appears stated age. Head:  Normocephalic, without obvious abnormality, atraumatic. Eyes:  Conjunctivae/corneas clear. PERRL, EOMs intact. Fundi benign. Ears:  Normal TMs and external ear canals both ears. Nose: Nares normal. Septum midline. Mucosa normal. No drainage or sinus tenderness.    Throat: Lips, mucosa, and tongue normal. Teeth and gums normal.   Neck: Supple, symmetrical, trachea midline, no adenopathy, thyroid: enlargement of the right lobe, no masses noted. Not tender to palpation. , no carotid bruit and no JVD. Lungs:   Clear to auscultation bilaterally. Chest wall:  No tenderness or deformity. Heart:  Regular rate and rhythm, S1, S2 normal, no murmur, click, rub or gallop. Breast Exam:  No tenderness, masses, or nipple abnormality. Abdomen:   Soft, non-tender. Bowel sounds normal. No masses,  No organomegaly. Extremities: Extremities normal, atraumatic, no cyanosis or edema. Pulses: 2+ and symmetric all extremities. Skin: Skin color, texture, turgor normal. No rashes or lesions. Lymph nodes: Cervical, supraclavicular, and axillary nodes normal.   Neurologic: CNII-XII intact. Normal strength, sensation and reflexes throughout. Assessment/Plan:     1. Annual physical exam  -fasting labs today. - CBC WITH AUTOMATED DIFF  - METABOLIC PANEL, COMPREHENSIVE  - LIPID PANEL    Also, she has additional complaints of the following--.     2. Thyroid enlarged  -right lobe enlargement. Will ultrasound.   -check TSH as well    - TSH 3RD GENERATION  - T4, FREE  - US THYROID/PARATHYROID/SOFT TISS; Future    3. Attention deficit disorder (ADD) without hyperactivity  -may have some depression as well.  -restart her adderall.  reviewed 1/20/2020     -recommended psychiatrist evaluation for addition of medications for depression.     - CBC WITH AUTOMATED DIFF  - METABOLIC PANEL, COMPREHENSIVE  - UA WITH REFLEX MICRO AND CULTURE     Orders Placed This Encounter    US THYROID/PARATHYROID/SOFT TISS     Standing Status:   Future     Standing Expiration Date:   2/17/2021     Order Specific Question:   Is Patient Pregnant?      Answer:   No     Order Specific Question:   Reason for Exam     Answer:   right lobe enlargement    CBC WITH AUTOMATED DIFF    METABOLIC PANEL, COMPREHENSIVE    LIPID PANEL    TSH 3RD GENERATION    T4, FREE    UA WITH REFLEX MICRO AND CULTURE    MICROSCOPIC EXAMINATION    URINE CULTURE, ROUTINE      Follow-up Disposition:       Follow up in 6 months     Return if symptoms worsen or fail to improve. Advised patient to call back or return to office if symptoms worsen/change/persist.     Discussed expected course/resolution/complications of diagnosis in detail with patient. Medication risks/benefits/costs/interactions/alternatives discussed with patient. Patient was given an after visit summary which includes diagnoses, current medications, & vitals. Patient expressed understanding with the diagnosis and plan.

## 2020-01-20 LAB
ALBUMIN SERPL-MCNC: 4.4 G/DL (ref 3.5–5.5)
ALBUMIN/GLOB SERPL: 1.8 {RATIO} (ref 1.2–2.2)
ALP SERPL-CCNC: 51 IU/L (ref 39–117)
ALT SERPL-CCNC: 13 IU/L (ref 0–32)
APPEARANCE UR: CLEAR
AST SERPL-CCNC: 16 IU/L (ref 0–40)
BACTERIA #/AREA URNS HPF: NORMAL /[HPF]
BACTERIA UR CULT: NORMAL
BASOPHILS # BLD AUTO: 0 X10E3/UL (ref 0–0.2)
BASOPHILS NFR BLD AUTO: 1 %
BILIRUB SERPL-MCNC: 0.5 MG/DL (ref 0–1.2)
BILIRUB UR QL STRIP: NEGATIVE
BUN SERPL-MCNC: 9 MG/DL (ref 6–20)
BUN/CREAT SERPL: 18 (ref 9–23)
CALCIUM SERPL-MCNC: 9.6 MG/DL (ref 8.7–10.2)
CASTS URNS QL MICRO: NORMAL /LPF
CHLORIDE SERPL-SCNC: 99 MMOL/L (ref 96–106)
CHOLEST SERPL-MCNC: 194 MG/DL (ref 100–199)
CO2 SERPL-SCNC: 22 MMOL/L (ref 20–29)
COLOR UR: YELLOW
CREAT SERPL-MCNC: 0.49 MG/DL (ref 0.57–1)
EOSINOPHIL # BLD AUTO: 0.3 X10E3/UL (ref 0–0.4)
EOSINOPHIL NFR BLD AUTO: 5 %
EPI CELLS #/AREA URNS HPF: NORMAL /HPF (ref 0–10)
ERYTHROCYTE [DISTWIDTH] IN BLOOD BY AUTOMATED COUNT: 12.4 % (ref 11.7–15.4)
GLOBULIN SER CALC-MCNC: 2.5 G/DL (ref 1.5–4.5)
GLUCOSE SERPL-MCNC: 77 MG/DL (ref 65–99)
GLUCOSE UR QL: NEGATIVE
HCT VFR BLD AUTO: 38.6 % (ref 34–46.6)
HDLC SERPL-MCNC: 70 MG/DL
HGB BLD-MCNC: 13 G/DL (ref 11.1–15.9)
HGB UR QL STRIP: NEGATIVE
IMM GRANULOCYTES # BLD AUTO: 0 X10E3/UL (ref 0–0.1)
IMM GRANULOCYTES NFR BLD AUTO: 0 %
KETONES UR QL STRIP: NEGATIVE
LDLC SERPL CALC-MCNC: 111 MG/DL (ref 0–99)
LEUKOCYTE ESTERASE UR QL STRIP: ABNORMAL
LYMPHOCYTES # BLD AUTO: 2.3 X10E3/UL (ref 0.7–3.1)
LYMPHOCYTES NFR BLD AUTO: 38 %
MCH RBC QN AUTO: 32.2 PG (ref 26.6–33)
MCHC RBC AUTO-ENTMCNC: 33.7 G/DL (ref 31.5–35.7)
MCV RBC AUTO: 96 FL (ref 79–97)
MICRO URNS: ABNORMAL
MONOCYTES # BLD AUTO: 0.4 X10E3/UL (ref 0.1–0.9)
MONOCYTES NFR BLD AUTO: 7 %
MUCOUS THREADS URNS QL MICRO: PRESENT
NEUTROPHILS # BLD AUTO: 3 X10E3/UL (ref 1.4–7)
NEUTROPHILS NFR BLD AUTO: 49 %
NITRITE UR QL STRIP: NEGATIVE
PH UR STRIP: 6.5 [PH] (ref 5–7.5)
PLATELET # BLD AUTO: 340 X10E3/UL (ref 150–450)
POTASSIUM SERPL-SCNC: 3.9 MMOL/L (ref 3.5–5.2)
PROT SERPL-MCNC: 6.9 G/DL (ref 6–8.5)
PROT UR QL STRIP: NEGATIVE
RBC # BLD AUTO: 4.04 X10E6/UL (ref 3.77–5.28)
RBC #/AREA URNS HPF: NORMAL /HPF (ref 0–2)
SODIUM SERPL-SCNC: 138 MMOL/L (ref 134–144)
SP GR UR: 1.01 (ref 1–1.03)
T4 FREE SERPL-MCNC: 1.09 NG/DL (ref 0.82–1.77)
TRIGL SERPL-MCNC: 65 MG/DL (ref 0–149)
TSH SERPL DL<=0.005 MIU/L-ACNC: 3.89 UIU/ML (ref 0.45–4.5)
URINALYSIS REFLEX, 377202: ABNORMAL
UROBILINOGEN UR STRIP-MCNC: 0.2 MG/DL (ref 0.2–1)
VLDLC SERPL CALC-MCNC: 13 MG/DL (ref 5–40)
WBC # BLD AUTO: 6.1 X10E3/UL (ref 3.4–10.8)
WBC #/AREA URNS HPF: NORMAL /HPF (ref 0–5)

## 2020-01-20 RX ORDER — DEXTROAMPHETAMINE SACCHARATE, AMPHETAMINE ASPARTATE MONOHYDRATE, DEXTROAMPHETAMINE SULFATE AND AMPHETAMINE SULFATE 2.5; 2.5; 2.5; 2.5 MG/1; MG/1; MG/1; MG/1
10 CAPSULE, EXTENDED RELEASE ORAL
Qty: 30 CAP | Refills: 0 | Status: SHIPPED | OUTPATIENT
Start: 2020-01-20 | End: 2020-06-08 | Stop reason: SDUPTHER

## 2020-01-31 ENCOUNTER — HOSPITAL ENCOUNTER (OUTPATIENT)
Dept: ULTRASOUND IMAGING | Age: 35
Discharge: HOME | End: 2020-01-31
Attending: INTERNAL MEDICINE
Payer: COMMERCIAL

## 2020-01-31 DIAGNOSIS — E04.9 THYROID ENLARGED: ICD-10-CM

## 2020-01-31 PROCEDURE — 76536 US EXAM OF HEAD AND NECK: CPT

## 2020-02-03 ENCOUNTER — TELEPHONE (OUTPATIENT)
Dept: INTERNAL MEDICINE CLINIC | Age: 35
End: 2020-02-03

## 2020-02-03 NOTE — TELEPHONE ENCOUNTER
Discussed with patient that her thyroid ultrasound is unchanged essentially compared to the ultrasound done in 2016. Her TSH done on 01/17/2020 was also in normal range. - 3.8. She is still concerned that she is having symptoms related to thyroid disease even is her labs and thyroid ultrasound were stable. I recommended can consult with an endocrinologist for further evaluation.

## 2020-06-08 DIAGNOSIS — F98.8 ATTENTION DEFICIT DISORDER, UNSPECIFIED HYPERACTIVITY PRESENCE: ICD-10-CM

## 2020-06-08 RX ORDER — DEXTROAMPHETAMINE SACCHARATE, AMPHETAMINE ASPARTATE MONOHYDRATE, DEXTROAMPHETAMINE SULFATE AND AMPHETAMINE SULFATE 2.5; 2.5; 2.5; 2.5 MG/1; MG/1; MG/1; MG/1
10 CAPSULE, EXTENDED RELEASE ORAL
Qty: 30 CAP | Refills: 0 | Status: SHIPPED | OUTPATIENT
Start: 2020-06-08 | End: 2020-07-13 | Stop reason: CLARIF

## 2020-06-08 NOTE — TELEPHONE ENCOUNTER
Orders Placed This Encounter    amphetamine-dextroamphetamine XR (ADDERALL XR) 10 mg XR capsule     Sig: Take 1 Cap by mouth every morning. Max Daily Amount: 10 mg.      Dispense:  30 Cap     Refill:  0       reviewed 6/8/2020

## 2020-06-19 ENCOUNTER — TELEPHONE (OUTPATIENT)
Dept: INTERNAL MEDICINE CLINIC | Age: 35
End: 2020-06-19

## 2020-06-19 NOTE — TELEPHONE ENCOUNTER
Previously never had to take adderall consistently however for the last 2 months she has taken it every day and now feels it is not working as well. Would like a higher dose.  Please advise

## 2020-07-13 ENCOUNTER — TELEPHONE (OUTPATIENT)
Dept: INTERNAL MEDICINE CLINIC | Age: 35
End: 2020-07-13

## 2020-07-13 DIAGNOSIS — F98.8 ATTENTION DEFICIT DISORDER, UNSPECIFIED HYPERACTIVITY PRESENCE: Primary | ICD-10-CM

## 2020-07-13 RX ORDER — DEXTROAMPHETAMINE SACCHARATE, AMPHETAMINE ASPARTATE, DEXTROAMPHETAMINE SULFATE AND AMPHETAMINE SULFATE 2.5; 2.5; 2.5; 2.5 MG/1; MG/1; MG/1; MG/1
10 TABLET ORAL DAILY
Qty: 30 TAB | Refills: 0 | Status: SHIPPED | OUTPATIENT
Start: 2020-07-13 | End: 2020-09-14 | Stop reason: SDUPTHER

## 2020-07-13 NOTE — TELEPHONE ENCOUNTER
Orders Placed This Encounter    dextroamphetamine-amphetamine (ADDERALL) 10 mg tablet     Sig: Take 1 Tab by mouth daily. Max Daily Amount: 10 mg. 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 She bought a temporary insurance to cover her until she can be rehired in August, 2020. She will get back her \"old\" insurance at that time. Her temporary insurance is not covering adderall xr. She would like it to be changed to the short acting adderall at this time. She is due for follow up for use of this medication. I recommended she follow up when she is back on her old insurance, as this has mostly only catastrophic coverage. She is to call for appointment for September, 2020.

## 2020-07-13 NOTE — TELEPHONE ENCOUNTER
Patient has new insurance for the summer only. They will only cover brand Adderall and not extended release. They potentially will cover Vyvanse, but any of these would need to be sent to [address] [place] [address]. Please call patient with any questions.

## 2020-09-14 ENCOUNTER — OFFICE VISIT (OUTPATIENT)
Dept: INTERNAL MEDICINE CLINIC | Age: 35
End: 2020-09-14

## 2020-09-14 VITALS
SYSTOLIC BLOOD PRESSURE: 100 MMHG | WEIGHT: 163 LBS | BODY MASS INDEX: 32 KG/M2 | RESPIRATION RATE: 18 BRPM | DIASTOLIC BLOOD PRESSURE: 82 MMHG | HEART RATE: 82 BPM | HEIGHT: 60 IN | TEMPERATURE: 97.8 F | OXYGEN SATURATION: 99 %

## 2020-09-14 DIAGNOSIS — F98.8 ATTENTION DEFICIT DISORDER, UNSPECIFIED HYPERACTIVITY PRESENCE: Primary | ICD-10-CM

## 2020-09-14 DIAGNOSIS — Z79.899 ENCOUNTER FOR LONG-TERM (CURRENT) USE OF MEDICATIONS: ICD-10-CM

## 2020-09-14 PROCEDURE — 99213 OFFICE O/P EST LOW 20 MIN: CPT | Performed by: INTERNAL MEDICINE

## 2020-09-14 RX ORDER — DEXTROAMPHETAMINE SACCHARATE, AMPHETAMINE ASPARTATE, DEXTROAMPHETAMINE SULFATE AND AMPHETAMINE SULFATE 2.5; 2.5; 2.5; 2.5 MG/1; MG/1; MG/1; MG/1
TABLET ORAL
Qty: 60 TAB | Refills: 0 | Status: SHIPPED | OUTPATIENT
Start: 2020-09-14 | End: 2020-11-23 | Stop reason: SDUPTHER

## 2020-09-14 RX ORDER — MONTELUKAST SODIUM 10 MG/1
TABLET ORAL
Qty: 90 TAB | Refills: 1 | Status: SHIPPED | OUTPATIENT
Start: 2020-09-14 | End: 2021-06-03 | Stop reason: SDUPTHER

## 2020-09-14 NOTE — PROGRESS NOTES
Subjective:      Tj Perry is a 30 y.o. female who presents today for follow up of her ADD. Her adderall was changed from XR to short acting due to temporary insurance change over the summer as she was furloughed from her job. 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 This is a part time position without benefits so she still has the insurance she had over the summer during her furlough. She is finding that she is needing coverage in the afternoon on busy days at work that she doesn't have with the short acting adderall. Patient Active Problem List   Diagnosis Code    Nephrolithiasis N20.0    Allergic rhinitis J30.9    Pap smear for cervical cancer screening Z12.4    PCOS (polycystic ovarian syndrome) E28.2    Allergic asthma without complication L29.936    Acquired hypothyroidism E03.9    Attention deficit disorder (ADD) without hyperactivity F98.8     Current Outpatient Medications   Medication Sig Dispense Refill    dextroamphetamine-amphetamine (ADDERALL) 10 mg tablet 1 tablet daily in the morning. May take a second tablet at noon if needed. Indications: attention deficit disorder with hyperactivity 60 Tab 0    montelukast (SINGULAIR) 10 mg tablet TAKE 1 TABLET BY MOUTH EVERY DAY 90 Tab 1    ibuprofen (ADVIL) 200 mg tablet Take 400 mg by mouth every six (6) hours as needed for Pain.  albuterol (PROVENTIL HFA, VENTOLIN HFA, PROAIR HFA) 90 mcg/actuation inhaler Take 1 Puff by inhalation every four (4) hours as needed for Wheezing. 1 Inhaler 1    Cetirizine (ZYRTEC) 10 mg cap Take  by mouth. Review of Systems    Pertinent items are noted in HPI. Objective:      Wt Readings from Last 3 Encounters:   01/17/20 169 lb 3.2 oz (76.7 kg)   04/19/19 165 lb 12.8 oz (75.2 kg)   01/16/19 161 lb (73 kg)     BP Readings from Last 3 Encounters:   01/17/20 113/71   04/19/19 112/80   01/16/19 110/78     Visit Vitals  /82 (BP 1 Location: Left arm) Pulse 82   Temp 97.8 °F (36.6 °C) (Temporal)   Resp 18   Ht 5' (1.524 m)   Wt 163 lb (73.9 kg)   LMP 09/11/2020   SpO2 99%   BMI 31.83 kg/m²     General appearance: alert, cooperative, no distress, appears stated age  Head: Normocephalic, without obvious abnormality, atraumatic  Neurologic: Mental status: Alert, oriented, thought content appropriate, affect: stable and normal    Assessment/Plan:     1. Attention deficit disorder, unspecified hyperactivity presence  -will add a second tablet of adderall on days in which afternoon coverage is needed. - dextroamphetamine-amphetamine (ADDERALL) 10 mg tablet; 1 tablet daily in the morning. May take a second tablet at noon if needed. Indications: attention deficit disorder with hyperactivity  Dispense: 60 Tab; Refill: 0    2. Encounter for long-term (current) use of medications       Follow-up Disposition:     Follow up in 6 months     Return if symptoms worsen or fail to improve. Advised patient to call back or return to office if symptoms worsen/change/persist.     Discussed expected course/resolution/complications of diagnosis in detail with patient. Medication risks/benefits/costs/interactions/alternatives discussed with patient. Patient was given an after visit summary which includes diagnoses, current medications, & vitals. Patient expressed understanding with the diagnosis and plan.

## 2020-11-23 DIAGNOSIS — F98.8 ATTENTION DEFICIT DISORDER, UNSPECIFIED HYPERACTIVITY PRESENCE: ICD-10-CM

## 2020-11-23 RX ORDER — DEXTROAMPHETAMINE SACCHARATE, AMPHETAMINE ASPARTATE, DEXTROAMPHETAMINE SULFATE AND AMPHETAMINE SULFATE 2.5; 2.5; 2.5; 2.5 MG/1; MG/1; MG/1; MG/1
TABLET ORAL
Qty: 60 TAB | Refills: 0 | Status: SHIPPED | OUTPATIENT
Start: 2020-11-23 | End: 2021-02-08 | Stop reason: SDUPTHER

## 2020-11-23 NOTE — TELEPHONE ENCOUNTER
Requested Prescriptions Pending Prescriptions Disp Refills  dextroamphetamine-amphetamine (ADDERALL) 10 mg tablet 60 Tab 0 Si tablet daily in the morning. May take a second tablet at noon if needed. Indications: attention deficit disorder with hyperactivity Mani [facility] [place], [address] [place] [address]

## 2020-11-24 NOTE — TELEPHONE ENCOUNTER
Orders Placed This Encounter  dextroamphetamine-amphetamine (ADDERALL) 10 mg tablet Si tablet daily in the morning. May take a second tablet at noon if needed. Indications: attention deficit disorder with hyperactivity Dispense:  60 Tab Refill:  0  reviewed 2020

## 2021-02-08 DIAGNOSIS — F98.8 ATTENTION DEFICIT DISORDER, UNSPECIFIED HYPERACTIVITY PRESENCE: ICD-10-CM

## 2021-02-08 RX ORDER — DEXTROAMPHETAMINE SACCHARATE, AMPHETAMINE ASPARTATE, DEXTROAMPHETAMINE SULFATE AND AMPHETAMINE SULFATE 2.5; 2.5; 2.5; 2.5 MG/1; MG/1; MG/1; MG/1
TABLET ORAL
Qty: 60 TAB | Refills: 0 | Status: SHIPPED | OUTPATIENT
Start: 2021-02-08 | End: 2021-04-20 | Stop reason: SDUPTHER

## 2021-02-08 NOTE — TELEPHONE ENCOUNTER
Requested Prescriptions Pending Prescriptions Disp Refills  dextroamphetamine-amphetamine (ADDERALL) 10 mg tablet 60 Tab 0 Si tablet daily in the morning. May take a second tablet at noon if needed. Indications: attention deficit disorder with hyperactivity Patient is requesting the generic for Cain Mota [address] [place] [address], [address] [place] [address]

## 2021-02-08 NOTE — TELEPHONE ENCOUNTER
Orders Placed This Encounter  dextroamphetamine-amphetamine (ADDERALL) 10 mg tablet Si tablet daily in the morning. May take a second tablet at noon if needed. Indications: attention deficit disorder with hyperactivity Dispense:  60 Tab Refill:  0  reviewed 2021

## 2021-04-20 DIAGNOSIS — F98.8 ATTENTION DEFICIT DISORDER, UNSPECIFIED HYPERACTIVITY PRESENCE: ICD-10-CM

## 2021-04-20 RX ORDER — DEXTROAMPHETAMINE SACCHARATE, AMPHETAMINE ASPARTATE, DEXTROAMPHETAMINE SULFATE AND AMPHETAMINE SULFATE 2.5; 2.5; 2.5; 2.5 MG/1; MG/1; MG/1; MG/1
TABLET ORAL
Qty: 60 TAB | Refills: 0 | Status: SHIPPED | OUTPATIENT
Start: 2021-04-20 | End: 2021-06-02 | Stop reason: SDUPTHER

## 2021-06-02 DIAGNOSIS — F98.8 ATTENTION DEFICIT DISORDER, UNSPECIFIED HYPERACTIVITY PRESENCE: ICD-10-CM

## 2021-06-02 RX ORDER — MONTELUKAST SODIUM 10 MG/1
TABLET ORAL
Qty: 90 TABLET | Refills: 1 | Status: CANCELLED | OUTPATIENT
Start: 2021-06-02

## 2021-06-02 NOTE — TELEPHONE ENCOUNTER
Last OV 9/14/2021  Future Appointments   Date Time Provider Julito Angeles   6/3/2021  9:40 AM Jayson Brush MD [facility]

## 2021-06-03 ENCOUNTER — OFFICE VISIT (OUTPATIENT)
Dept: INTERNAL MEDICINE CLINIC | Age: 36
End: 2021-06-03
Payer: COMMERCIAL

## 2021-06-03 VITALS
TEMPERATURE: 97.5 F | OXYGEN SATURATION: 98 % | BODY MASS INDEX: 28.86 KG/M2 | DIASTOLIC BLOOD PRESSURE: 72 MMHG | RESPIRATION RATE: 16 BRPM | SYSTOLIC BLOOD PRESSURE: 107 MMHG | HEART RATE: 82 BPM | WEIGHT: 147 LBS | HEIGHT: 60 IN

## 2021-06-03 DIAGNOSIS — F98.8 ATTENTION DEFICIT DISORDER (ADD) WITHOUT HYPERACTIVITY: Primary | ICD-10-CM

## 2021-06-03 DIAGNOSIS — Z11.59 SCREENING FOR VIRAL DISEASE: ICD-10-CM

## 2021-06-03 DIAGNOSIS — E04.2 MULTINODULAR GOITER: ICD-10-CM

## 2021-06-03 DIAGNOSIS — E28.2 PCOS (POLYCYSTIC OVARIAN SYNDROME): ICD-10-CM

## 2021-06-03 DIAGNOSIS — Z79.899 ENCOUNTER FOR LONG-TERM (CURRENT) USE OF MEDICATIONS: ICD-10-CM

## 2021-06-03 LAB
ALBUMIN SERPL-MCNC: 4.2 G/DL (ref 3.5–5)
ALBUMIN/GLOB SERPL: 1.4 {RATIO} (ref 1.1–2.2)
ALP SERPL-CCNC: 60 U/L (ref 45–117)
ALT SERPL-CCNC: 15 U/L (ref 12–78)
ANION GAP SERPL CALC-SCNC: 3 MMOL/L (ref 5–15)
AST SERPL-CCNC: 8 U/L (ref 15–37)
BASOPHILS # BLD: 0 K/UL (ref 0–0.1)
BASOPHILS NFR BLD: 1 % (ref 0–1)
BILIRUB SERPL-MCNC: 0.3 MG/DL (ref 0.2–1)
BUN SERPL-MCNC: 12 MG/DL (ref 6–20)
BUN/CREAT SERPL: 21 (ref 12–20)
CALCIUM SERPL-MCNC: 9.4 MG/DL (ref 8.5–10.1)
CHLORIDE SERPL-SCNC: 104 MMOL/L (ref 97–108)
CO2 SERPL-SCNC: 31 MMOL/L (ref 21–32)
CREAT SERPL-MCNC: 0.58 MG/DL (ref 0.55–1.02)
DIFFERENTIAL METHOD BLD: NORMAL
EOSINOPHIL # BLD: 0.2 K/UL (ref 0–0.4)
EOSINOPHIL NFR BLD: 3 % (ref 0–7)
ERYTHROCYTE [DISTWIDTH] IN BLOOD BY AUTOMATED COUNT: 12.3 % (ref 11.5–14.5)
GLOBULIN SER CALC-MCNC: 3.1 G/DL (ref 2–4)
GLUCOSE SERPL-MCNC: 91 MG/DL (ref 65–100)
HCT VFR BLD AUTO: 39.5 % (ref 35–47)
HCV AB SERPL QL IA: NONREACTIVE
HCV COMMENT,HCGAC: NORMAL
HGB BLD-MCNC: 12.8 G/DL (ref 11.5–16)
IMM GRANULOCYTES # BLD AUTO: 0 K/UL (ref 0–0.04)
IMM GRANULOCYTES NFR BLD AUTO: 0 % (ref 0–0.5)
LYMPHOCYTES # BLD: 1.5 K/UL (ref 0.8–3.5)
LYMPHOCYTES NFR BLD: 21 % (ref 12–49)
MCH RBC QN AUTO: 31.8 PG (ref 26–34)
MCHC RBC AUTO-ENTMCNC: 32.4 G/DL (ref 30–36.5)
MCV RBC AUTO: 98.3 FL (ref 80–99)
MONOCYTES # BLD: 0.7 K/UL (ref 0–1)
MONOCYTES NFR BLD: 9 % (ref 5–13)
NEUTS SEG # BLD: 4.8 K/UL (ref 1.8–8)
NEUTS SEG NFR BLD: 66 % (ref 32–75)
NRBC # BLD: 0 K/UL (ref 0–0.01)
NRBC BLD-RTO: 0 PER 100 WBC
PLATELET # BLD AUTO: 344 K/UL (ref 150–400)
PMV BLD AUTO: 10.6 FL (ref 8.9–12.9)
POTASSIUM SERPL-SCNC: 4.3 MMOL/L (ref 3.5–5.1)
PROT SERPL-MCNC: 7.3 G/DL (ref 6.4–8.2)
RBC # BLD AUTO: 4.02 M/UL (ref 3.8–5.2)
SODIUM SERPL-SCNC: 138 MMOL/L (ref 136–145)
T4 FREE SERPL-MCNC: 0.9 NG/DL (ref 0.8–1.5)
TSH SERPL DL<=0.05 MIU/L-ACNC: 3.81 UIU/ML (ref 0.36–3.74)
WBC # BLD AUTO: 7.3 K/UL (ref 3.6–11)

## 2021-06-03 PROCEDURE — 99214 OFFICE O/P EST MOD 30 MIN: CPT | Performed by: INTERNAL MEDICINE

## 2021-06-03 RX ORDER — BISMUTH SUBSALICYLATE 262 MG
1 TABLET,CHEWABLE ORAL DAILY
COMMUNITY

## 2021-06-03 RX ORDER — DEXTROAMPHETAMINE SACCHARATE, AMPHETAMINE ASPARTATE, DEXTROAMPHETAMINE SULFATE AND AMPHETAMINE SULFATE 2.5; 2.5; 2.5; 2.5 MG/1; MG/1; MG/1; MG/1
TABLET ORAL
Qty: 60 TABLET | Refills: 0 | Status: SHIPPED | OUTPATIENT
Start: 2021-06-03 | End: 2021-08-17 | Stop reason: SDUPTHER

## 2021-06-03 RX ORDER — MONTELUKAST SODIUM 10 MG/1
TABLET ORAL
Qty: 90 TABLET | Refills: 1 | Status: SHIPPED | OUTPATIENT
Start: 2021-06-03 | End: 2022-01-03 | Stop reason: SDUPTHER

## 2021-06-03 NOTE — PROGRESS NOTES
Assessment/Plan:     1. Attention deficit disorder (ADD) without hyperactivity  -using her adderall with more regularity. She finds that her current dose is very helpful. She has been successful and less stressed at her job and is in process of moving forward to another job. - CBC WITH AUTOMATED DIFF; Future  - METABOLIC PANEL, COMPREHENSIVE; Future  - MONITOR SCREEN 10-DRUG CLASS PROFILE; Future    2. Screening for viral disease    - HEPATITIS C AB; Future    3. Encounter for long-term (current) use of medications    - CBC WITH AUTOMATED DIFF; Future  - METABOLIC PANEL, COMPREHENSIVE; Future  - MONITOR SCREEN 10-DRUG CLASS PROFILE; Future    4. Multinodular goiter  -last ultrasound done 1/30/20.  -check thyroid function  today.     - TSH 3RD GENERATION; Future  - T4, FREE; Future    5. PCOS (polycystic ovarian syndrome)  -overdue for follow up with gyn.   -encouraged her to follow up with her gyn. [facility] likely [facility]. Orders Placed This Encounter    CBC WITH AUTOMATED DIFF     Standing Status:   Future     Number of Occurrences:   1     Standing Expiration Date:   5/0/8074    METABOLIC PANEL, COMPREHENSIVE     Standing Status:   Future     Number of Occurrences:   1     Standing Expiration Date:   6/3/2022    HEPATITIS C AB     Standing Status:   Future     Number of Occurrences:   1     Standing Expiration Date:   6/3/2022    TSH 3RD GENERATION     Standing Status:   Future     Number of Occurrences:   1     Standing Expiration Date:   6/3/2022    T4, FREE     Standing Status:   Future     Number of Occurrences:   1     Standing Expiration Date:   6/3/2022    MONITOR SCREEN 10-DRUG CLASS PROFILE     ATLASSITEID:[id number]    multivitamin (ONE A DAY) tablet     Sig: Take 1 Tablet by mouth daily.     montelukast (SINGULAIR) 10 mg tablet     Sig: TAKE 1 TABLET BY MOUTH EVERY DAY     Dispense:  90 Tablet     Refill:  1 Follow-up Disposition:     Follow up in 6 months         Disclaimer:  Return if symptoms worsen or fail to improve. Advised patient to call back or return to office if symptoms worsen/change/persist.     Discussed expected course/resolution/complications of diagnosis in detail with patient. Medication risks/benefits/costs/interactions/alternatives discussed with patient. Patient was given an after visit summary which includes diagnoses, current medications, & vitals. Patient expressed understanding with the diagnosis and plan. Subjective:      Domenico Farrar is a 37 y.o. female who presents today for follow up of her ADD and use of singulair for her allergic asthma. Since last visit:  -has lost weight with change of job  -wants to change gyn    Patient Care Team:  -Dr. Austin Minion - gyn    Due for:  -gyn exam    Current Outpatient Medications   Medication Sig Dispense Refill    multivitamin (ONE A DAY) tablet Take 1 Tablet by mouth daily.  montelukast (SINGULAIR) 10 mg tablet TAKE 1 TABLET BY MOUTH EVERY DAY 90 Tablet 1    dextroamphetamine-amphetamine (ADDERALL) 10 mg tablet 1 tablet daily in the morning. May take a second tablet at noon if needed. Indications: attention deficit disorder with hyperactivity 60 Tab 0    Cetirizine (ZYRTEC) 10 mg cap Take  by mouth. Review of Systems    Pertinent items are noted in HPI. Objective:      Wt Readings from Last 3 Encounters:   06/03/21 147 lb (66.7 kg)   09/14/20 163 lb (73.9 kg)   01/17/20 169 lb 3.2 oz (76.7 kg)     BP Readings from Last 3 Encounters:   06/03/21 107/72   09/14/20 100/82   01/17/20 113/71     Visit Vitals  /72   Pulse 82   Temp 97.5 °F (36.4 °C) (Temporal)   Resp 16   Ht 5' (1.524 m)   Wt 147 lb (66.7 kg)   SpO2 98%   BMI 28.71 kg/m²     General appearance: alert, cooperative, no distress, appears stated age  Head: Normocephalic, without obvious abnormality, atraumatic  Neck: supple, symmetrical, trachea midline, no adenopathy, thyroid:enlarged, left greater than right, no tenderness/mass/nodules, no carotid bruit and no JVD  Lungs: clear to auscultation bilaterally  Heart: regular rate and rhythm, S1, S2 normal, no murmur, click, rub or gallop

## 2021-06-03 NOTE — TELEPHONE ENCOUNTER
Orders Placed This Encounter    dextroamphetamine-amphetamine (ADDERALL) 10 mg tablet     Si tablet daily in the morning. May take a second tablet at noon if needed.   Indications: attention deficit disorder with hyperactivity     Dispense:  60 Tablet     Refill:  0        reviewed 6/3/2021

## 2021-06-04 LAB
AMPHETAMINES UR QL SCN: NEGATIVE NG/ML
BARBITURATES UR QL SCN: NEGATIVE NG/ML
BENZODIAZ UR QL SCN: NEGATIVE NG/ML
BZE UR QL SCN: NEGATIVE NG/ML
CANNABINOIDS UR QL SCN: NEGATIVE NG/ML
CREAT UR-MCNC: 34.2 MG/DL (ref 20–300)
METHADONE UR QL SCN: NEGATIVE NG/ML
OPIATES UR QL SCN: NEGATIVE NG/ML
OXYCODONE+OXYMORPHONE UR QL SCN: NEGATIVE NG/ML
PCP UR QL: NEGATIVE NG/ML
PH UR: 6.9 [PH] (ref 4.5–8.9)
PLEASE NOTE:, 733163: NORMAL
PROPOXYPH UR QL SCN: NEGATIVE NG/ML

## 2021-08-12 DIAGNOSIS — F98.8 ATTENTION DEFICIT DISORDER, UNSPECIFIED HYPERACTIVITY PRESENCE: ICD-10-CM

## 2021-08-12 RX ORDER — DEXTROAMPHETAMINE SACCHARATE, AMPHETAMINE ASPARTATE, DEXTROAMPHETAMINE SULFATE AND AMPHETAMINE SULFATE 2.5; 2.5; 2.5; 2.5 MG/1; MG/1; MG/1; MG/1
TABLET ORAL
Qty: 60 TABLET | Refills: 0 | Status: CANCELLED | OUTPATIENT
Start: 2021-08-12

## 2021-08-12 NOTE — TELEPHONE ENCOUNTER
[facility] ([facility]) [phone] (H)     Refill on adderall to low

## 2021-09-01 ENCOUNTER — OFFICE VISIT (OUTPATIENT)
Dept: INTERNAL MEDICINE CLINIC | Age: 36
End: 2021-09-01
Payer: COMMERCIAL

## 2021-09-01 VITALS
WEIGHT: 147.2 LBS | TEMPERATURE: 97.5 F | DIASTOLIC BLOOD PRESSURE: 69 MMHG | HEIGHT: 60 IN | RESPIRATION RATE: 16 BRPM | HEART RATE: 75 BPM | BODY MASS INDEX: 28.9 KG/M2 | SYSTOLIC BLOOD PRESSURE: 105 MMHG | OXYGEN SATURATION: 98 %

## 2021-09-01 DIAGNOSIS — R22.42 NODULE OF SKIN OF LEFT LOWER LEG: Primary | ICD-10-CM

## 2021-09-01 PROCEDURE — 99213 OFFICE O/P EST LOW 20 MIN: CPT | Performed by: INTERNAL MEDICINE

## 2021-09-01 NOTE — PROGRESS NOTES
Assessment/Plan:       ICD-10-CM ICD-9-CM    1. Nodule of skin of left lower leg  R22.42 782.2         -on exam, the nodule she noted was a lymph node. She states that often if she shaves in that area, the skin and follicles can become inflamed. Discuss with patient that when her skin is inflamed, she may feel a slight enlargement of the lymph nodes in that region reacting to the skin irritation.   -no abnormalities noted on exam with bilateral breast exam with patient showing me specifically the area of concern.  -no rash noted today around neck. Follow-up Disposition:       Disclaimer:  Return if symptoms worsen or fail to improve. Advised patient to call back or return to office if symptoms worsen/change/persist.     Discussed expected course/resolution/complications of diagnosis in detail with patient. Medication risks/benefits/costs/interactions/alternatives discussed with patient. Patient was given an after visit summary which includes diagnoses, current medications, & vitals. Patient expressed understanding with the diagnosis and plan. Subjective:      Meagan Alvarez is a 37 y.o. female who presents today for concerns about a nodule noted in her left groin      -she has also concerns of area on her neck and left breast that feels 'abnormal' no nodules.    -she has noted that her left neck and shoulder and arm feeling abnormal.  No trauma or weakness.   -she had abnormal coloring or rash on the left side of her neck, but that has resolved.  -the nodule in her groin has been there for several weeks. Doesn't think it has gotten any larger       Objective:      Wt Readings from Last 3 Encounters:   09/01/21 147 lb 3.2 oz (66.8 kg)   06/03/21 147 lb (66.7 kg)   09/14/20 163 lb (73.9 kg)     Visit Vitals  /69   Pulse 75   Temp 97.5 °F (36.4 °C) (Temporal)   Resp 16   Ht 5' (1.524 m)   Wt 147 lb 3.2 oz (66.8 kg)   SpO2 98%   BMI 28.75 kg/m²     General appearance: alert, cooperative, no distress, appears stated age  Head: Normocephalic, without obvious abnormality, atraumatic  Neck: supple, symmetrical, trachea midline, no adenopathy, no carotid bruit and no JVD, significant muscle tension in bilateral upper trapezius ridge. Breasts: normal appearance, no masses or tenderness, Normal to palpation without dominant masses  Skin: Skin color, texture, turgor normal. No rashes or lesions  Left inguinal : in area where patient noted mass, there is a palpable lymph node.  Not enlarged, freely mobile

## 2021-09-27 DIAGNOSIS — F98.8 ATTENTION DEFICIT DISORDER, UNSPECIFIED HYPERACTIVITY PRESENCE: ICD-10-CM

## 2021-09-27 RX ORDER — DEXTROAMPHETAMINE SACCHARATE, AMPHETAMINE ASPARTATE, DEXTROAMPHETAMINE SULFATE AND AMPHETAMINE SULFATE 2.5; 2.5; 2.5; 2.5 MG/1; MG/1; MG/1; MG/1
TABLET ORAL
Qty: 60 TABLET | Refills: 0 | Status: SHIPPED | OUTPATIENT
Start: 2021-09-27 | End: 2021-12-01 | Stop reason: SDUPTHER

## 2021-09-27 NOTE — TELEPHONE ENCOUNTER
Orders Placed This Encounter    dextroamphetamine-amphetamine (ADDERALL) 10 mg tablet     Si tablet daily in the morning. May take a second tablet at noon if needed.   Indications: attention deficit disorder with hyperactivity     Dispense:  60 Tablet     Refill:  0        reviewed 2021

## 2021-12-01 DIAGNOSIS — F98.8 ATTENTION DEFICIT DISORDER, UNSPECIFIED HYPERACTIVITY PRESENCE: ICD-10-CM

## 2021-12-01 RX ORDER — DEXTROAMPHETAMINE SACCHARATE, AMPHETAMINE ASPARTATE, DEXTROAMPHETAMINE SULFATE AND AMPHETAMINE SULFATE 2.5; 2.5; 2.5; 2.5 MG/1; MG/1; MG/1; MG/1
TABLET ORAL
Qty: 60 TABLET | Refills: 0 | Status: SHIPPED | OUTPATIENT
Start: 2021-12-01 | End: 2022-02-01 | Stop reason: SDUPTHER

## 2021-12-01 NOTE — TELEPHONE ENCOUNTER
Requested Prescriptions     Pending Prescriptions Disp Refills    dextroamphetamine-amphetamine (ADDERALL) 10 mg tablet 60 Tablet 0     Si tablet daily in the morning. May take a second tablet at noon if needed.   Indications: attention deficit disorder with hyperactivity         Margarita Preston [id number] - [facility], [address] [place] [address]

## 2022-01-03 ENCOUNTER — VIRTUAL VISIT (OUTPATIENT)
Dept: INTERNAL MEDICINE CLINIC | Age: 37
End: 2022-01-03
Payer: COMMERCIAL

## 2022-01-03 DIAGNOSIS — F98.8 ATTENTION DEFICIT DISORDER, UNSPECIFIED HYPERACTIVITY PRESENCE: ICD-10-CM

## 2022-01-03 DIAGNOSIS — Z91.89 LACK OF MOTIVATION: Primary | ICD-10-CM

## 2022-01-03 PROCEDURE — 99213 OFFICE O/P EST LOW 20 MIN: CPT | Performed by: INTERNAL MEDICINE

## 2022-01-03 RX ORDER — MONTELUKAST SODIUM 10 MG/1
TABLET ORAL
Qty: 90 TABLET | Refills: 1 | Status: SHIPPED | OUTPATIENT
Start: 2022-01-03 | End: 2022-03-29 | Stop reason: SDUPTHER

## 2022-01-03 NOTE — PROGRESS NOTES
Chrissy Martinez is a 31 y.o. female who was seen by synchronous (real-time) audio-video technology on 1/3/2022. She confirmed that, for purposes of billing, this is a virtual visit with her provider for which we will submit a claim for reimbursement to her insurance company. She is aware that she will be responsible for any copays, coinsurance amounts or other amounts not covered by her insurance company. Do you accept - YES    This visit was completed was completed virtually using [employer]      Subjective:   Chrissy Martinez was seen for Medication Evaluation      -she states that she is doing well, but still feeling some lack of motivation. She doesn't think it is depression. She is going out with friend, enjoying her time with work. She is in therapy. She is taking her medication regularly for ADD and this has helped her to focus. She gives some examples of feeling such as 'I can't get over things - for example, she feels badly if she missed a call or text from her best friend'. She can't get over not feeling motivated to get up in the morning. Prior to Admission medications    Medication Sig Start Date End Date Taking? Authorizing Provider   dextroamphetamine-amphetamine (ADDERALL) 10 mg tablet 1 tablet daily in the morning. May take a second tablet at noon if needed. Indications: attention deficit disorder with hyperactivity 12/1/21  Yes Jeffry Reynoso MD   multivitamin (ONE A DAY) tablet Take 1 Tablet by mouth daily. Yes Provider, Historical   montelukast (SINGULAIR) 10 mg tablet TAKE 1 TABLET BY MOUTH EVERY DAY 6/3/21  Yes Bibi Kaur MD   Cetirizine (ZYRTEC) 10 mg cap Take  by mouth daily.    Yes Provider, Historical       Allergies   Allergen Reactions    Sulfa (Sulfonamide Antibiotics) Rash    Minocycline Other (comments)     Brain swelling         ROS - per HPI      Objective:     General: alert, cooperative, no distress   Mental  status: pe mental status_general use: normal mood, behavior, speech, dress, motor activity, and thought processes, able to follow commands   Eyes: EOM intact, normal sclera   Mouth: not examined   Neck: no visualized mass   Resp: PULM - obs findings: normal effort and no respiratory distress   Neuro: neuro - obs: no gross deficits   Musculoskeletal: normal ROM of neck   Skin: skin exam: no discoloration or lesions of concern on visible areas   Psychiatric: normal affect, no hallucinations       Assessment & Plan:   1. Lack of motivation  2. Attention deficit disorder, unspecified hyperactivity presence    Plan:  -symptoms not typical for depression. Doing well at work and socially patient reports. -recommended neuropsych testing and consultation with psychiatrist for assistance in medication evaluation and evaluation for decreased motivation. Encouraged continuation of counseling.    -patient also requested refill of her singulair. Orders Placed This Encounter    montelukast (SINGULAIR) 10 mg tablet     Sig: TAKE 1 TABLET BY MOUTH EVERY DAY     Dispense:  90 Tablet     Refill:  1        CPT Codes 61633-67012 for Established Patients may apply to this Telehealth Visit  Time-based coding, delete if not needed: I spent at least 25 minutes with this established patient, and >50% of the time was spent counseling and/or coordinating care regarding ADD and decreased motivation    Due to this being a TeleHealth evaluation, many elements of the physical examination are unable to be assessed. Pursuant to the emergency declaration under the [facility], [facility] waiver authority and the [employer] and [employer] Act, this Virtual  Visit was conducted, with patient's consent, to reduce the patient's risk of exposure to COVID-19 and provide continuity of care for an established patient.      Services were provided through a video synchronous discussion virtually to substitute for in-person clinic visit. We discussed the expected course, resolution and complications of the diagnosis(es) in detail. Medication risks, benefits, costs, interactions, and alternatives were discussed as indicated. I advised her to contact the office if her condition worsens, changes or fails to improve as anticipated. She expressed understanding with the diagnosis(es) and plan.      Cliff Arguelles MD

## 2022-01-03 NOTE — PROGRESS NOTES
Verified name and birth date for privacy precautions. Chart reviewed in preparation for today's visit. Chief Complaint   Patient presents with    Medication Evaluation          Health Maintenance Due   Topic    Flu Vaccine (1)    COVID-19 Vaccine (3 - Booster for Pfizer series)         Wt Readings from Last 3 Encounters:   09/01/21 147 lb 3.2 oz (66.8 kg)   06/03/21 147 lb (66.7 kg)   09/14/20 163 lb (73.9 kg)     Temp Readings from Last 3 Encounters:   09/01/21 97.5 °F (36.4 °C) (Temporal)   06/03/21 97.5 °F (36.4 °C) (Temporal)   09/14/20 97.8 °F (36.6 °C) (Temporal)     BP Readings from Last 3 Encounters:   09/01/21 105/69   06/03/21 107/72   09/14/20 100/82     Pulse Readings from Last 3 Encounters:   09/01/21 75   06/03/21 82   09/14/20 82         Learning Assessment:  :     Learning Assessment 4/19/2019 1/10/2018 3/23/2015 10/31/2013   PRIMARY LEARNER Patient Patient Patient Patient   HIGHEST LEVEL OF EDUCATION - PRIMARY LEARNER  SOME COLLEGE SOME COLLEGE 4 YEARS OF COLLEGE -   BARRIERS PRIMARY LEARNER NONE NONE NONE -   CO-LEARNER CAREGIVER - No No -   PRIMARY LANGUAGE ENGLISH ENGLISH ENGLISH ENGLISH    NEED - - No -   LEARNER PREFERENCE PRIMARY DEMONSTRATION PICTURES DEMONSTRATION DEMONSTRATION     - VIDEOS - -   LEARNING SPECIAL TOPICS - - no -   ANSWERED BY patient patient patient patient   RELATIONSHIP SELF SELF SELF SELF       Depression Screening:  :     3 most recent PHQ Screens 1/3/2022   Little interest or pleasure in doing things More than half the days   Feeling down, depressed, irritable, or hopeless Several days   Total Score PHQ 2 3   Trouble falling or staying asleep, or sleeping too much More than half the days   Feeling tired or having little energy Several days   PATIENT UNABLE TO COMPLETE DEPRESSION SCREENING BECAUSE SHE EXPRESSED DIFFICULTY PICKING ONE OF THE ANSWERS AVAILABLE     Fall Risk Assessment:  :     No flowsheet data found.     Abuse Screening:  :     Abuse Screening Questionnaire 1/3/2022 4/19/2019 1/10/2018 11/2/2015 12/17/2014   Do you ever feel afraid of your partner? N N N N N   Are you in a relationship with someone who physically or mentally threatens you? N N N N N   Is it safe for you to go home?  Jacques Tamayo

## 2022-02-01 DIAGNOSIS — F98.8 ATTENTION DEFICIT DISORDER, UNSPECIFIED HYPERACTIVITY PRESENCE: ICD-10-CM

## 2022-02-01 RX ORDER — DEXTROAMPHETAMINE SACCHARATE, AMPHETAMINE ASPARTATE, DEXTROAMPHETAMINE SULFATE AND AMPHETAMINE SULFATE 2.5; 2.5; 2.5; 2.5 MG/1; MG/1; MG/1; MG/1
TABLET ORAL
Qty: 60 TABLET | Refills: 0 | Status: SHIPPED | OUTPATIENT
Start: 2022-02-01 | End: 2022-03-29 | Stop reason: SDUPTHER

## 2022-02-01 NOTE — TELEPHONE ENCOUNTER
Requested Prescriptions     Pending Prescriptions Disp Refills    dextroamphetamine-amphetamine (ADDERALL) 10 mg tablet 60 Tablet 0     Si tablet daily in the morning. May take a second tablet at noon if needed.   Indications: attention deficit disorder with hyperactivity       Mai Haas [id number] [address], [address] [place] [address]

## 2022-02-01 NOTE — TELEPHONE ENCOUNTER
Orders Placed This Encounter    dextroamphetamine-amphetamine (ADDERALL) 10 mg tablet     Si tablet daily in the morning. May take a second tablet at noon if needed.   Indications: attention deficit disorder with hyperactivity     Dispense:  60 Tablet     Refill:  0        reviewed 2022

## 2022-03-19 PROBLEM — E04.2 MULTINODULAR GOITER: Status: ACTIVE | Noted: 2021-06-03

## 2022-03-19 PROBLEM — F98.8 ATTENTION DEFICIT DISORDER (ADD) WITHOUT HYPERACTIVITY: Status: ACTIVE | Noted: 2019-04-19

## 2022-03-29 DIAGNOSIS — F98.8 ATTENTION DEFICIT DISORDER, UNSPECIFIED HYPERACTIVITY PRESENCE: ICD-10-CM

## 2022-03-30 RX ORDER — MONTELUKAST SODIUM 10 MG/1
TABLET ORAL
Qty: 90 TABLET | Refills: 1 | Status: SHIPPED | OUTPATIENT
Start: 2022-03-30 | End: 2022-08-16 | Stop reason: SDUPTHER

## 2022-03-30 RX ORDER — DEXTROAMPHETAMINE SACCHARATE, AMPHETAMINE ASPARTATE, DEXTROAMPHETAMINE SULFATE AND AMPHETAMINE SULFATE 2.5; 2.5; 2.5; 2.5 MG/1; MG/1; MG/1; MG/1
TABLET ORAL
Qty: 60 TABLET | Refills: 0 | Status: SHIPPED | OUTPATIENT
Start: 2022-03-30 | End: 2022-05-27 | Stop reason: SDUPTHER

## 2022-03-30 NOTE — TELEPHONE ENCOUNTER
Orders Placed This Encounter    montelukast (SINGULAIR) 10 mg tablet     Sig: TAKE 1 TABLET BY MOUTH EVERY DAY     Dispense:  90 Tablet     Refill:  1    dextroamphetamine-amphetamine (ADDERALL) 10 mg tablet     Si tablet daily in the morning. May take a second tablet at noon if needed.   Indications: attention deficit disorder with hyperactivity     Dispense:  60 Tablet     Refill:  0        reviewed 3/30/2022

## 2022-05-27 ENCOUNTER — TELEPHONE (OUTPATIENT)
Dept: INTERNAL MEDICINE CLINIC | Age: 37
End: 2022-05-27

## 2022-05-27 DIAGNOSIS — F98.8 ATTENTION DEFICIT DISORDER, UNSPECIFIED HYPERACTIVITY PRESENCE: ICD-10-CM

## 2022-05-27 NOTE — TELEPHONE ENCOUNTER
**pt is requesting 90 tablets due to travel outside of the country**    Requested Prescriptions     Pending Prescriptions Disp Refills    dextroamphetamine-amphetamine (ADDERALL) 10 mg tablet 90 Tablet 0     Si tablet daily in the morning. May take a second tablet at noon if needed.   Indications: attention deficit disorder with hyperactivity       Ann Marie Leon [id number] [address], [address] [place] [address]

## 2022-05-31 RX ORDER — DEXTROAMPHETAMINE SACCHARATE, AMPHETAMINE ASPARTATE, DEXTROAMPHETAMINE SULFATE AND AMPHETAMINE SULFATE 2.5; 2.5; 2.5; 2.5 MG/1; MG/1; MG/1; MG/1
TABLET ORAL
Qty: 90 TABLET | Refills: 0 | Status: SHIPPED | OUTPATIENT
Start: 2022-05-31 | End: 2022-10-04 | Stop reason: SDUPTHER

## 2022-05-31 NOTE — TELEPHONE ENCOUNTER
Orders Placed This Encounter    dextroamphetamine-amphetamine (ADDERALL) 10 mg tablet     Si tablet daily in the morning.   Indications: attention deficit disorder with hyperactivity     Dispense:  90 Tablet     Refill:  0        reviewed 2022

## 2022-08-16 DIAGNOSIS — F98.8 ATTENTION DEFICIT DISORDER, UNSPECIFIED HYPERACTIVITY PRESENCE: ICD-10-CM

## 2022-08-17 RX ORDER — DEXTROAMPHETAMINE SACCHARATE, AMPHETAMINE ASPARTATE, DEXTROAMPHETAMINE SULFATE AND AMPHETAMINE SULFATE 2.5; 2.5; 2.5; 2.5 MG/1; MG/1; MG/1; MG/1
TABLET ORAL
Qty: 90 TABLET | Refills: 0 | OUTPATIENT
Start: 2022-08-17

## 2022-08-17 RX ORDER — MONTELUKAST SODIUM 10 MG/1
TABLET ORAL
Qty: 90 TABLET | Refills: 1 | Status: SHIPPED | OUTPATIENT
Start: 2022-08-17

## 2022-08-17 NOTE — TELEPHONE ENCOUNTER
Orders Placed This Encounter    montelukast (SINGULAIR) 10 mg tablet     Sig: TAKE 1 TABLET BY MOUTH EVERY DAY     Dispense:  90 Tablet     Refill:  1        reviewed 8/17/2022     Patient requested refill for her adderall. It was too soon. Per - last refill for 90 days was on 6/1/2022. Not due for refill until 9/1/2022.

## 2022-10-04 DIAGNOSIS — F98.8 ATTENTION DEFICIT DISORDER, UNSPECIFIED HYPERACTIVITY PRESENCE: ICD-10-CM

## 2022-10-04 RX ORDER — DEXTROAMPHETAMINE SACCHARATE, AMPHETAMINE ASPARTATE, DEXTROAMPHETAMINE SULFATE AND AMPHETAMINE SULFATE 2.5; 2.5; 2.5; 2.5 MG/1; MG/1; MG/1; MG/1
TABLET ORAL
Qty: 30 TABLET | Refills: 0 | Status: SHIPPED | OUTPATIENT
Start: 2022-10-04

## 2022-10-04 NOTE — TELEPHONE ENCOUNTER
Orders Placed This Encounter    dextroamphetamine-amphetamine (ADDERALL) 10 mg tablet     Si tablet daily in the morning. Indications: attention deficit disorder with hyperactivity     Dispense:  30 Tablet     Refill:  0        reviewed 10/4/2022     Patient notified she is due for follow up.   Did not show for her appointment on  2022

## 2022-11-03 ENCOUNTER — TELEPHONE (OUTPATIENT)
Dept: INTERNAL MEDICINE CLINIC | Age: 37
End: 2022-11-03

## 2022-11-03 NOTE — TELEPHONE ENCOUNTER
Reason for call:    Patient is going out of the country and wants to know if Dr. Lori Mendez can refill her medication since she has scheduled a follow up appt for 11/21/22.     Is this a new problem: yes     Date of last appointment:  8/30/2022     Can we respond via [facility]: no    Best call back number:     Ninfa Malhotraer - [phone] Negative

## 2022-11-03 NOTE — TELEPHONE ENCOUNTER
Left voicemail message for patient advising office visit is needed for refill per Nayeli Alcantar MD sent to Yakov Olguin  Caller: Unspecified (Today, 11:38 AM)   I cannot refill a controlled substance without a visit. I have not seen patient in over a year.   She no showed her appointment in August.       Cecilia Suh MD

## 2022-11-21 ENCOUNTER — OFFICE VISIT (OUTPATIENT)
Dept: INTERNAL MEDICINE CLINIC | Age: 37
End: 2022-11-21
Payer: COMMERCIAL

## 2022-11-21 VITALS
RESPIRATION RATE: 16 BRPM | WEIGHT: 142 LBS | BODY MASS INDEX: 27.88 KG/M2 | DIASTOLIC BLOOD PRESSURE: 89 MMHG | HEIGHT: 60 IN | OXYGEN SATURATION: 99 % | TEMPERATURE: 97.8 F | SYSTOLIC BLOOD PRESSURE: 117 MMHG | HEART RATE: 86 BPM

## 2022-11-21 DIAGNOSIS — Z79.899 ENCOUNTER FOR LONG-TERM (CURRENT) USE OF MEDICATIONS: ICD-10-CM

## 2022-11-21 DIAGNOSIS — Z79.899 CONTROLLED SUBSTANCE AGREEMENT SIGNED: ICD-10-CM

## 2022-11-21 DIAGNOSIS — F98.8 ATTENTION DEFICIT DISORDER, UNSPECIFIED HYPERACTIVITY PRESENCE: Primary | ICD-10-CM

## 2022-11-21 PROBLEM — Z86.39 HISTORY OF THYROID NODULE: Status: ACTIVE | Noted: 2021-06-03

## 2022-11-21 PROCEDURE — 99213 OFFICE O/P EST LOW 20 MIN: CPT | Performed by: INTERNAL MEDICINE

## 2022-11-21 RX ORDER — DEXTROAMPHETAMINE SACCHARATE, AMPHETAMINE ASPARTATE, DEXTROAMPHETAMINE SULFATE AND AMPHETAMINE SULFATE 2.5; 2.5; 2.5; 2.5 MG/1; MG/1; MG/1; MG/1
TABLET ORAL
Qty: 60 TABLET | Refills: 0 | Status: SHIPPED | OUTPATIENT
Start: 2022-11-21

## 2022-11-21 NOTE — PROGRESS NOTES
Verified name and birth date for privacy precautions. Chart reviewed in preparation for today's visit. Chief Complaint   Patient presents with    Medication Evaluation          Health Maintenance Due   Topic    COVID-19 Vaccine (3 - Booster for Pfizer series)    Flu Vaccine (1)         Wt Readings from Last 3 Encounters:   11/21/22 142 lb (64.4 kg)   09/01/21 147 lb 3.2 oz (66.8 kg)   06/03/21 147 lb (66.7 kg)     Temp Readings from Last 3 Encounters:   11/21/22 97.8 °F (36.6 °C) (Temporal)   09/01/21 97.5 °F (36.4 °C) (Temporal)   06/03/21 97.5 °F (36.4 °C) (Temporal)     BP Readings from Last 3 Encounters:   11/21/22 117/89   09/01/21 105/69   06/03/21 107/72     Pulse Readings from Last 3 Encounters:   11/21/22 86   09/01/21 75   06/03/21 82         Learning Assessment:  :     Learning Assessment 4/19/2019 1/10/2018 3/23/2015 10/31/2013   PRIMARY LEARNER Patient Patient Patient Patient   HIGHEST LEVEL OF EDUCATION - PRIMARY LEARNER  SOME COLLEGE SOME COLLEGE 4 YEARS OF COLLEGE -   BARRIERS PRIMARY LEARNER NONE NONE NONE -   CO-LEARNER CAREGIVER - No No -   PRIMARY LANGUAGE ENGLISH ENGLISH ENGLISH ENGLISH    NEED - - No -   LEARNER PREFERENCE PRIMARY DEMONSTRATION PICTURES DEMONSTRATION DEMONSTRATION     - VIDEOS - -   LEARNING SPECIAL TOPICS - - no -   ANSWERED BY patient patient patient patient   RELATIONSHIP SELF SELF SELF SELF       Depression Screening:  :     3 most recent PHQ Screens 11/21/2022   Little interest or pleasure in doing things Not at all   Feeling down, depressed, irritable, or hopeless Not at all   Total Score PHQ 2 0   Trouble falling or staying asleep, or sleeping too much -   Feeling tired or having little energy -       Fall Risk Assessment:  :     No flowsheet data found. Abuse Screening:  :     Abuse Screening Questionnaire 11/21/2022 1/3/2022 4/19/2019 1/10/2018 11/2/2015 12/17/2014   Do you ever feel afraid of your partner?  N N N N N N   Are you in a relationship with someone who physically or mentally threatens you? N N N N N N   Is it safe for you to go home?  Mariano León - Y Y Y

## 2022-11-21 NOTE — LETTER
AGREEMENT for controlled medication treatment    I, Stella Young, have agreed to a course of treatment that includes taking controlled medication. For this treatment I designate ______Bibi Warren MD_______________________________ as the Designated Provider.     The purpose of this agreement is to prevent misunderstandings about controlled medications I may be taking for treatment, and to comply with applicable laws. I understand this agreement is essential to the trust and confidence necessary in a provider-patient relationship and that the designated provider will treat me in accordance with the statements below. As part of my treatment I agree to the followin.  USE  a. I will take the controlled medication as instructed by the designated provider and avoid improper use of controlled medications. b.   I will not share, sell or trade controlled medication with anyone as this is a criminal offense.   c.   I will not use any illegal controlled substance, including marijuana, cocaine, etc. as this is a criminal offense. 2.  PROVIDERS  a. I will only obtain controlled medication from the designated provider. b.   I will not attempt to obtain the same or similar controlled medications, such as opioid pain medication, stimulants, anti-anxiety or hypnotics from any other provider as this is a criminal offense.  c.   I will inform the designated provider about all other licensed professionals providing medical care to me and authorize communication between all providers to coordinate care, particularly prescribing or dispensing of controlled medications. 3.  PHARMACY  a.   I will only fill controlled medication prescriptions at the approved pharmacy as listed below. 4.  OFFICE VISITS  a. I agree to attend scheduled office visit appointments. b.   I am aware my office visits may be monthly or as determined necessary by the designated provider.   c.   I will communicate fully with the designated provider about the character and intensity of my symptoms, the effect of the symptoms on my daily life, and how well the controlled medication is helping to relieve the cause of my symptoms. 5.  REFILLS OR CALL-IN PRESCRIPTIONS OF CONTROLLED MEDICATIONS  a. I agree that refills of my prescriptions for controlled medications will be made at the time of an office visit during regular office hours. No refills will be available during evenings or on weekends. Abusive or inappropriate behavior related to medication refills will not be tolerated. b.   I will not call the office repeatedly to inquire about my controlled medication. I understand that medications will be written on the due date and not before. Calling the office repeatedly will be considered harassment, and I may be discharged from the practice. c.   Controlled medications may not be called in for refill, but doing so is at the discretion of the designated provider. d.   I am aware that only I must  prescriptions for controlled medications at the office but the designated provider may allow a designee to  the prescription from the office under very specific circumstance that may develop. 6.  TOXICOLOGY SCREENING  a. I agree to random urine toxicology screenings in order to comply with government and [address] [place] [address] regulations. I understand that I will be financially responsible for any charges incurred for the urine toxicology screening, which may not be covered by my insurance. Failure to do so will be considered non-compliance and I may be discharged. b. In some cases an oral swab or hair sample may be substituted for a urine screen. This is at the discretion of the designated provider.   I understand that I will be financially responsible for any charges incurred as well.  c.   I understand that if the urine toxicology does not show my medications prescribed to me by the designated provider, or it shows any illegal substance or any other medications NOT prescribed by the designated providers, I may be discharged from this practice at the discretion of the designated provider and no further controlled medications will be prescribed or follow-up appointments scheduled. Also, if any illegal substances are detected on the urine toxicology, this information may be provided to local law enforcement. 7. PILL COUNTS  a. I am aware I may be called at any time to come into the office for a count of all my remaining controlled medications in order to help the designated provider understand the rate at which I use my controlled medications and to more effectively adjust dosage. b. I agree that I will use my medication at a rate NO greater than the prescribed rate and that use of my medication at a greater rate will result in my being without medication for the period of time until next expected due date. c. I will bring in the containers with the medication prescribed by all providers, including the designated provider, to each office visit even if there is no medication remaining. All controlled medication will be in the original containers from the pharmacy for each medication. Failure to do so will be considered non-compliance and I may be discharged from the practice. 8.  LOSS OR THEFT OF MEDICATION  a. I will safeguard my controlled medication from loss or theft. b.   Lost or stolen controlled medication may not be replaced. This includes a prescription that has not yet been filled at the pharmacy. c.   In the event my controlled medications are stolen or lost, I will notify the designated providers office immediately. If such event occurs during the night, weekend or holiday, I will leave a detailed message on the answering machine or answering service at the number listed above.  d.   I will file and produce an official police report for any effort to replace controlled medications prescribed.     9.  AGENCY COLLABORATION  I authorize the designated provider and the authorized pharmacy/pharmacist to cooperate fully with any city, state, or federal law enforcement agency in the investigation of any possible misuse, sale or other diversion of my controlled medication. 10.  TREATMENT  I understand that if I violate any of the conditions, my controlled medication and/or treatment will be terminated. If the violation involves obtaining controlled substances and/or dangerous drugs from another source, the incident may be reported to other medical facilities and authorities, including law enforcement. In this case, the designated provider may taper off the medication over a period of several days, as necessary, to avoid withdrawal symptoms or will suggest alternate treatment facilities. Also, a drug dependence treatment program may be recommended. 11.  AGREEMENT  I agree to follow these guidelines that have been fully explained to me. All of my questions and concerns regarding treatment have been adequately answered. A copy of this document has been given to me. I agree to use ______________________________ Authorized Pharmacy located at _________________________________________________________________      Telephone ________________________________for filling ALL controlled medication prescriptions.     This agreement is entered into on 11/21/2022     Patient signature__________________________________________________________    Legal Guardian signature___________________________________________________    Provider signature_________________________________________________________    Witness signature_________________________________________________________

## 2022-11-21 NOTE — PROGRESS NOTES
Assessment/Plan:     1. Attention deficit disorder, unspecified hyperactivity presence  -controlled substance agreement signed today for use of adderall. Contract reviewed with patient and copy given to patient. - reviewed.   -adderall refilled today. - dextroamphetamine-amphetamine (ADDERALL) 10 mg tablet; 1 tablet daily in the morning and 1 tab in the evening as needed. Indications: attention deficit disorder with hyperactivity  Dispense: 60 Tablet; Refill: 0    2. Encounter for long-term (current) use of medications     Orders Placed This Encounter    dextroamphetamine-amphetamine (ADDERALL) 10 mg tablet     Si tablet daily in the morning and 1 tab in the evening as needed. Indications: attention deficit disorder with hyperactivity     Dispense:  60 Tablet     Refill:  0          Follow-up Disposition:     Follow up in 6 months               Subjective:      Luis Paniagua is a 30 y.o. female who presents today for follow up of her ADD. Since last visit 1/3/2022 (virtual):  -using adderall 10mg daily   - reviewed. Last refill was 10/5/2022  -she has been traveling with her job and fellowships with conferences in [facility].  -she has noted that she is needing use of her adderall. Has tried reducing doses, but she is finding the lack of focus is affecting her work. Patient Care Team:  -Dr. Carter Mooney - gyn         Objective:      Wt Readings from Last 3 Encounters:   22 142 lb (64.4 kg)   21 147 lb 3.2 oz (66.8 kg)   21 147 lb (66.7 kg)     BP Readings from Last 3 Encounters:   22 117/89   21 105/69   21 107/72     Visit Vitals  /89   Pulse 86   Temp 97.8 °F (36.6 °C) (Temporal)   Resp 16   Ht 5' (1.524 m)   Wt 142 lb (64.4 kg)   LMP 11/15/2022   SpO2 99%   BMI 27.73 kg/m²     General appearance: alert, cooperative, no distress, appears stated age  Head: Normocephalic, without obvious abnormality, atraumatic  Neck: supple, symmetrical, trachea midline, no adenopathy, no carotid bruit, and no JVD  Lungs: clear to auscultation bilaterally  Heart: regular rate and rhythm, S1, S2 normal, no murmur, click, rub or gallop  Abdomen: soft, non-tender. Bowel sounds normal. No masses,  no organomegaly  Extremities: extremities normal, atraumatic, no cyanosis or edema  Pulses: 2+ and symmetric  Neurologic: Mental status: Alert, oriented, thought content appropriate, affect: stable and normal              Disclaimer:  Return if symptoms worsen or fail to improve. Advised patient to call back or return to office if symptoms worsen/change/persist.     Discussed expected course/resolution/complications of diagnosis in detail with patient. Medication risks/benefits/costs/interactions/alternatives discussed with patient. Patient was given an after visit summary which includes diagnoses, current medications, & vitals. Patient expressed understanding with the diagnosis and plan.

## 2022-11-22 PROBLEM — Z79.899 CONTROLLED SUBSTANCE AGREEMENT SIGNED: Status: ACTIVE | Noted: 2022-11-22

## 2022-12-21 DIAGNOSIS — F98.8 ATTENTION DEFICIT DISORDER, UNSPECIFIED HYPERACTIVITY PRESENCE: ICD-10-CM

## 2022-12-22 RX ORDER — MONTELUKAST SODIUM 10 MG/1
TABLET ORAL
Qty: 90 TABLET | Refills: 1 | Status: SHIPPED | OUTPATIENT
Start: 2022-12-22

## 2022-12-22 RX ORDER — DEXTROAMPHETAMINE SACCHARATE, AMPHETAMINE ASPARTATE, DEXTROAMPHETAMINE SULFATE AND AMPHETAMINE SULFATE 2.5; 2.5; 2.5; 2.5 MG/1; MG/1; MG/1; MG/1
TABLET ORAL
Qty: 60 TABLET | Refills: 0 | Status: SHIPPED | OUTPATIENT
Start: 2022-12-22

## 2022-12-22 NOTE — TELEPHONE ENCOUNTER
Orders Placed This Encounter    montelukast (SINGULAIR) 10 mg tablet     Sig: TAKE 1 TABLET BY MOUTH EVERY DAY     Dispense:  90 Tablet     Refill:  1    dextroamphetamine-amphetamine (ADDERALL) 10 mg tablet     Si tablet daily in the morning and 1 tab in the evening as needed.   Indications: attention deficit disorder with hyperactivity     Dispense:  60 Tablet     Refill:  0        reviewed 2022

## 2023-02-22 DIAGNOSIS — F98.8 ATTENTION DEFICIT DISORDER, UNSPECIFIED HYPERACTIVITY PRESENCE: ICD-10-CM

## 2023-02-23 RX ORDER — MONTELUKAST SODIUM 10 MG/1
TABLET ORAL
Qty: 90 TABLET | Refills: 1 | Status: SHIPPED | OUTPATIENT
Start: 2023-02-23

## 2023-02-23 RX ORDER — DEXTROAMPHETAMINE SACCHARATE, AMPHETAMINE ASPARTATE, DEXTROAMPHETAMINE SULFATE AND AMPHETAMINE SULFATE 2.5; 2.5; 2.5; 2.5 MG/1; MG/1; MG/1; MG/1
TABLET ORAL
Qty: 60 TABLET | Refills: 0 | Status: SHIPPED | OUTPATIENT
Start: 2023-02-23

## 2023-02-24 NOTE — TELEPHONE ENCOUNTER
Orders Placed This Encounter    dextroamphetamine-amphetamine (ADDERALL) 10 mg tablet     Si tablet daily in the morning and 1 tab in the evening as needed.   Indications: attention deficit disorder with hyperactivity     Dispense:  60 Tablet     Refill:  0        reviewed 2023

## 2023-02-27 ENCOUNTER — TELEPHONE (OUTPATIENT)
Dept: INTERNAL MEDICINE CLINIC | Age: 38
End: 2023-02-27

## 2023-03-14 ENCOUNTER — PATIENT MESSAGE (OUTPATIENT)
Dept: INTERNAL MEDICINE CLINIC | Age: 38
End: 2023-03-14

## 2023-03-16 RX ORDER — ALBUTEROL SULFATE 90 UG/1
1 AEROSOL, METERED RESPIRATORY (INHALATION)
Qty: 18 G | Refills: 0 | Status: SHIPPED | OUTPATIENT
Start: 2023-03-16

## 2023-03-16 NOTE — TELEPHONE ENCOUNTER
From: Genaro Saleh  To: Edinson Hanley MD  Sent: 3/14/2023 1:53 PM EDT  Subject: Inhaler request     Hi Dr Mario Chandler and team in wondering if I can request a prescription for an emergency inhaler that used to be prescribed to me. With allergy season upon us, I recently noticed I was low and want to be sure Im prepared if need be.  Thank you

## 2023-04-25 DIAGNOSIS — F98.8 ATTENTION DEFICIT DISORDER, UNSPECIFIED HYPERACTIVITY PRESENCE: ICD-10-CM

## 2023-04-27 DIAGNOSIS — F98.8 ATTENTION DEFICIT DISORDER, UNSPECIFIED HYPERACTIVITY PRESENCE: ICD-10-CM

## 2023-04-28 RX ORDER — DEXTROAMPHETAMINE SACCHARATE, AMPHETAMINE ASPARTATE, DEXTROAMPHETAMINE SULFATE AND AMPHETAMINE SULFATE 2.5; 2.5; 2.5; 2.5 MG/1; MG/1; MG/1; MG/1
TABLET ORAL
Qty: 60 TABLET | Refills: 0 | Status: SHIPPED | OUTPATIENT
Start: 2023-04-28

## 2023-04-28 RX ORDER — DEXTROAMPHETAMINE SACCHARATE, AMPHETAMINE ASPARTATE, DEXTROAMPHETAMINE SULFATE AND AMPHETAMINE SULFATE 2.5; 2.5; 2.5; 2.5 MG/1; MG/1; MG/1; MG/1
TABLET ORAL
Qty: 60 TABLET | Refills: 0 | OUTPATIENT
Start: 2023-04-28

## 2023-05-26 RX ORDER — MONTELUKAST SODIUM 10 MG/1
1 TABLET ORAL DAILY
COMMUNITY
Start: 2023-02-23

## 2023-05-26 RX ORDER — ALBUTEROL SULFATE 90 UG/1
1 AEROSOL, METERED RESPIRATORY (INHALATION) EVERY 6 HOURS PRN
COMMUNITY
Start: 2023-03-16

## 2023-05-26 RX ORDER — DEXTROAMPHETAMINE SACCHARATE, AMPHETAMINE ASPARTATE, DEXTROAMPHETAMINE SULFATE AND AMPHETAMINE SULFATE 2.5; 2.5; 2.5; 2.5 MG/1; MG/1; MG/1; MG/1
TABLET ORAL
COMMUNITY
Start: 2023-04-28

## 2023-09-14 ENCOUNTER — OFFICE VISIT (OUTPATIENT)
Age: 38
End: 2023-09-14

## 2023-09-14 VITALS
BODY MASS INDEX: 29.84 KG/M2 | TEMPERATURE: 98.3 F | OXYGEN SATURATION: 99 % | DIASTOLIC BLOOD PRESSURE: 72 MMHG | HEIGHT: 60 IN | RESPIRATION RATE: 16 BRPM | HEART RATE: 73 BPM | WEIGHT: 152 LBS | SYSTOLIC BLOOD PRESSURE: 104 MMHG

## 2023-09-14 DIAGNOSIS — F98.8 ATTENTION DEFICIT DISORDER (ADD) WITHOUT HYPERACTIVITY: Primary | ICD-10-CM

## 2023-09-14 DIAGNOSIS — Z79.899 ENCOUNTER FOR LONG-TERM (CURRENT) USE OF MEDICATIONS: ICD-10-CM

## 2023-09-14 DIAGNOSIS — N20.0 KIDNEY STONE: ICD-10-CM

## 2023-09-14 PROCEDURE — 99213 OFFICE O/P EST LOW 20 MIN: CPT | Performed by: INTERNAL MEDICINE

## 2023-09-14 RX ORDER — DEXTROAMPHETAMINE SACCHARATE, AMPHETAMINE ASPARTATE, DEXTROAMPHETAMINE SULFATE AND AMPHETAMINE SULFATE 2.5; 2.5; 2.5; 2.5 MG/1; MG/1; MG/1; MG/1
10 TABLET ORAL 2 TIMES DAILY
Qty: 60 TABLET | Refills: 0 | Status: SHIPPED | OUTPATIENT
Start: 2023-09-14 | End: 2023-10-14

## 2023-09-14 RX ORDER — CIPROFLOXACIN 500 MG/1
500 TABLET, FILM COATED ORAL 2 TIMES DAILY
COMMUNITY

## 2023-09-14 SDOH — ECONOMIC STABILITY: FOOD INSECURITY: WITHIN THE PAST 12 MONTHS, THE FOOD YOU BOUGHT JUST DIDN'T LAST AND YOU DIDN'T HAVE MONEY TO GET MORE.: NEVER TRUE

## 2023-09-14 SDOH — ECONOMIC STABILITY: INCOME INSECURITY: HOW HARD IS IT FOR YOU TO PAY FOR THE VERY BASICS LIKE FOOD, HOUSING, MEDICAL CARE, AND HEATING?: NOT HARD AT ALL

## 2023-09-14 SDOH — ECONOMIC STABILITY: HOUSING INSECURITY
IN THE LAST 12 MONTHS, WAS THERE A TIME WHEN YOU DID NOT HAVE A STEADY PLACE TO SLEEP OR SLEPT IN A SHELTER (INCLUDING NOW)?: NO

## 2023-09-14 SDOH — ECONOMIC STABILITY: FOOD INSECURITY: WITHIN THE PAST 12 MONTHS, YOU WORRIED THAT YOUR FOOD WOULD RUN OUT BEFORE YOU GOT MONEY TO BUY MORE.: NEVER TRUE

## 2023-09-14 ASSESSMENT — PATIENT HEALTH QUESTIONNAIRE - PHQ9
SUM OF ALL RESPONSES TO PHQ QUESTIONS 1-9: 0
2. FEELING DOWN, DEPRESSED OR HOPELESS: 0
SUM OF ALL RESPONSES TO PHQ QUESTIONS 1-9: 0
SUM OF ALL RESPONSES TO PHQ9 QUESTIONS 1 & 2: 0
1. LITTLE INTEREST OR PLEASURE IN DOING THINGS: 0

## 2025-01-16 ENCOUNTER — OFFICE VISIT (OUTPATIENT)
Age: 40
End: 2025-01-16
Payer: MEDICAID

## 2025-01-16 VITALS
DIASTOLIC BLOOD PRESSURE: 71 MMHG | BODY MASS INDEX: 35.73 KG/M2 | WEIGHT: 182 LBS | HEIGHT: 60 IN | HEART RATE: 74 BPM | SYSTOLIC BLOOD PRESSURE: 107 MMHG

## 2025-01-16 DIAGNOSIS — R53.83 OTHER FATIGUE: ICD-10-CM

## 2025-01-16 DIAGNOSIS — E03.9 ACQUIRED HYPOTHYROIDISM: Primary | ICD-10-CM

## 2025-01-16 DIAGNOSIS — E28.2 PCOS (POLYCYSTIC OVARIAN SYNDROME): ICD-10-CM

## 2025-01-16 PROCEDURE — 99205 OFFICE O/P NEW HI 60 MIN: CPT | Performed by: INTERNAL MEDICINE

## 2025-01-16 PROCEDURE — G2211 COMPLEX E/M VISIT ADD ON: HCPCS | Performed by: INTERNAL MEDICINE

## 2025-01-16 RX ORDER — LEVOTHYROXINE SODIUM 125 UG/1
125 TABLET ORAL DAILY
COMMUNITY
Start: 2024-10-03 | End: 2025-10-03

## 2025-01-16 RX ORDER — VITAMIN A ACETATE, BETA CAROTENE, ASCORBIC ACID, CHOLECALCIFEROL, .ALPHA.-TOCOPHEROL ACETATE, DL-, THIAMINE MONONITRATE, RIBOFLAVIN, NIACINAMIDE, PYRIDOXINE HYDROCHLORIDE, FOLIC ACID, CYANOCOBALAMIN, CALCIUM CARBONATE, FERROUS FUMARATE, ZINC OXIDE, CUPRIC OXIDE 3080; 12; 120; 400; 1; 1.84; 3; 20; 22; 920; 25; 200; 27; 10; 2 [IU]/1; UG/1; MG/1; [IU]/1; MG/1; MG/1; MG/1; MG/1; MG/1; [IU]/1; MG/1; MG/1; MG/1; MG/1; MG/1
1 TABLET, FILM COATED ORAL DAILY
COMMUNITY

## 2025-01-16 NOTE — PROGRESS NOTES
Chief Complaint   Patient presents with    Thyroid Problem       * New Patient Visit     General:  Dx hypothyroid by mid-wife in October   Had a baby in May - going great then felt awful   Put on 125mcg on generic (wt based 132mcg)   Takes in AM, 60 min before food  No iron, calcium or multi-vitamins within 4 hours   Is still nursing     None of her symptoms have gone away on T4  Still sluggish, tired, weight gain, low sex drive (was up post baby) is dry everywhere  Constipation, feels toxins everywhere, brain fog   Does not think is depressed but no herself   Does not who she is anymore   Not motivated to work out    Family History of thyroid disease: mom underactive, mat aunt hypothyroid     Hx PCOS - for a long time; exploding cysts pre-period as late teen; but never happened again  Never on medication for it  Recent DHEA-s was elevated  Always regular periods, but shorter --> got back 5mo after baby   Surprise pregnancy; previously a M/C  Has a ton of hair - chin, inner thighs; none on back     Labs/Studies    Lab Results   Component Value Date/Time    TSH 3.81 06/03/2021 10:53 AM    T4FREE 0.9 06/03/2021 10:53 AM     Lab Results   Component Value Date/Time    TSH 3.81 06/03/2021 10:53 AM    TSH 3.890 01/17/2020 02:06 PM      1/8/24 TSH 3.3, FT4 0.9  10/2/24 TPO > 600, .7, FT4 0.4  11/18/24 TSH 0.8, FT4 1.1    1/31/20 Neck US:  RIGHT: 5.5 x 1.5 x 2.3 cm, compared to 5.4 x 2.5 x 1.6 cm on the prior study (12/16/16). LEFT: 4.6 x 1.4 x 1.6 cm, compared to 3.8 x 1.6 x 1.2 cm on the prior study.. Diffuse heterogeneous nodular appearance with no dominant discrete nodule.    10/14/24 Neck US: Right 2.1 x 3.3 x 5.8 cm w/increased vascularity with hypo 1.1 x 1.0 x 1.0 cm nodule (TR4). Left 2.0 x 1.7 x 4.8 cm with diffuse vascularity    Past Medical History:   Diagnosis Date    ADHD (attention deficit hyperactivity disorder)     Asthma     Calculus of kidney 2014    Contact dermatitis and other eczema, due to

## 2025-01-21 LAB
25(OH)D3+25(OH)D2 SERPL-MCNC: 88.5 NG/ML (ref 30–100)
ACTH PLAS-MCNC: 14.2 PG/ML (ref 7.2–63.3)
ANA SER QL: NEGATIVE
DHEA-S SERPL-MCNC: 541 UG/DL (ref 57.3–279.2)
FERRITIN SERPL-MCNC: 112 NG/ML (ref 15–150)
IRON SERPL-MCNC: 126 UG/DL (ref 27–159)
MAGNESIUM SERPL-MCNC: 1.7 MG/DL (ref 1.6–2.3)
T4 FREE SERPL-MCNC: 1.34 NG/DL (ref 0.82–1.77)
TSH SERPL DL<=0.005 MIU/L-ACNC: 1.01 UIU/ML (ref 0.45–4.5)

## 2025-01-22 LAB — 17OHP SERPL-MCNC: 14 NG/DL

## 2025-01-23 LAB — ZINC SERPL-MCNC: 67 UG/DL (ref 44–115)

## 2025-01-24 LAB
METANEPH FREE SERPL-MCNC: <25 PG/ML (ref 0–88)
NORMETANEPHRINE SERPL-MCNC: 46.3 PG/ML (ref 0–210.1)

## 2025-01-28 LAB
TESTOST FREE MFR SERPL: 3.43 % (ref 0.5–2.8)
TESTOST FREE SERPL-MCNC: 0.93 NG/DL (ref 0.1–0.85)
TESTOST SERPL-MCNC: 27 NG/DL (ref 10–55)